# Patient Record
Sex: FEMALE | Race: WHITE | NOT HISPANIC OR LATINO | Employment: FULL TIME | URBAN - METROPOLITAN AREA
[De-identification: names, ages, dates, MRNs, and addresses within clinical notes are randomized per-mention and may not be internally consistent; named-entity substitution may affect disease eponyms.]

---

## 2022-07-03 ENCOUNTER — TRANSFERRED RECORDS (OUTPATIENT)
Dept: HEALTH INFORMATION MANAGEMENT | Facility: CLINIC | Age: 52
End: 2022-07-03

## 2022-07-03 LAB
ALT SERPL-CCNC: 28 U/L (ref 14–63)
AST SERPL-CCNC: 15 U/L (ref 15–37)

## 2022-07-05 ENCOUNTER — TRANSFERRED RECORDS (OUTPATIENT)
Dept: HEALTH INFORMATION MANAGEMENT | Facility: CLINIC | Age: 52
End: 2022-07-05

## 2022-07-07 ENCOUNTER — TRANSFERRED RECORDS (OUTPATIENT)
Dept: HEALTH INFORMATION MANAGEMENT | Facility: CLINIC | Age: 52
End: 2022-07-07

## 2022-07-26 ENCOUNTER — TRANSFERRED RECORDS (OUTPATIENT)
Dept: HEALTH INFORMATION MANAGEMENT | Facility: CLINIC | Age: 52
End: 2022-07-26

## 2022-08-01 ENCOUNTER — TRANSFERRED RECORDS (OUTPATIENT)
Dept: MULTI SPECIALTY CLINIC | Facility: CLINIC | Age: 52
End: 2022-08-01

## 2022-08-01 LAB
CREATININE (EXTERNAL): 0.67 MG/DL (ref 0.51–0.95)
GFR ESTIMATED (EXTERNAL): >60 ML/MIN/1.73M2
GLUCOSE (EXTERNAL): 99 MG/DL (ref 74–100)
POTASSIUM (EXTERNAL): 3.5 MMOL/L (ref 3.5–5.1)

## 2022-08-02 ENCOUNTER — MEDICAL CORRESPONDENCE (OUTPATIENT)
Dept: HEALTH INFORMATION MANAGEMENT | Facility: CLINIC | Age: 52
End: 2022-08-02

## 2022-08-02 ENCOUNTER — TRANSCRIBE ORDERS (OUTPATIENT)
Dept: OTHER | Age: 52
End: 2022-08-02

## 2022-08-02 DIAGNOSIS — N88.8 CERVICAL MASS: Primary | ICD-10-CM

## 2022-08-03 ENCOUNTER — PATIENT OUTREACH (OUTPATIENT)
Dept: ONCOLOGY | Facility: CLINIC | Age: 52
End: 2022-08-03

## 2022-08-03 ENCOUNTER — PRE VISIT (OUTPATIENT)
Dept: ONCOLOGY | Facility: CLINIC | Age: 52
End: 2022-08-03

## 2022-08-03 NOTE — PROGRESS NOTES
"New Patient Hematology / Oncology Nurse Navigator Note     Referral Date: 8/2/22    Referring provider:   Linda Carroll MD    [facility]    [address]    [place] [id number] [phone] (Work)    [phone] (Fax)      Referring Clinic/Organization: [facility]     Referred to: GynOnc    Requested provider (if applicable): First available - did not specify     Evaluation for : Thickened endometrial stripe, Endometrial hyperplasia without atypia, referral from OBGYN       Clinical History (per Nurse review of records provided):       8/2 Pelvic US showing:  IMPRESSION:   The endometrium is thickened but decreased compared to the prior study. There is   some blood flow within the endometrium.     Enlarged uterus with uterine fibroids.     Neither ovary was visualized on this exam.     7/7/22 Op Note: hysteroscopy and dilation and Curettage, removal endocervical polyp -- BOOKMARKED     7/29, 8/1 ED visits.--BOOKMARKED      Note from ED provider: \"As I attempted to discuss with you, your ultrasound showed a thickened endometrial stripe. A malignancy is definitely not excluded despite what you may or may not have been told in the past. Therefore, Dr. Carroll is going to reach out to a gynecology oncologist - someone who may be able to perform a hysterectomy to help with your vaginal bleeding. In the meantime, Dr. Carroll recommended increasing your Provera to hopefully get your symptoms under better control. Please return to ED with any concerns.  Electronically signed by Jarred Yeager MD at 08/02/2022 7:34 AM CDT\"    Clinical Assessment / Barriers to Care (Per Nurse):    Pt lives in [place]      Records Location: [facility]     Records Needed:   Images, path from [place]     Additional testing needed prior to consult:   N/A    Referral updates and Plan:   OUTGOING CALL to pt, no answer. No voicemail picked up so unable to leave VM.    Will route to scheduling to continue to try to reach " patient. Will follow-up as needed.    Hold Dr Grande 8/24 2:20PM @ Courtney Jordan, BSN, RN, PHN, OCN  Hematology/Oncology Nurse Navigator  [facility]  [phone]

## 2022-08-03 NOTE — TELEPHONE ENCOUNTER
Action 2022 10:19 AM ABT   Action Taken Slides from [place] received and taken to 5th floor path lab for review     RECORDS STATUS - ALL OTHER DIAGNOSIS      Action    Action Taken 8/3/22  Spoke w/ Pt - pt advised all recs from [facility]. Pt advised all imaging done @ [facility], none @ [facility]. Pt very anxious to get appt scheduled.     Imaging resolved to PACS  12:27 PM     RECORDS RECEIVED FROM: [facility], [facility]   DATE RECEIVED:    NOTES STATUS DETAILS   OFFICE NOTE from referring provider Received 8/3 Linda Carroll MD   DISCHARGE REPORT from the ER CE - [facility] 22, 7/3/22   OPERATIVE REPORT CE - [facility] 22: Hysteroscopy, D & C   MEDICATION LIST CE [facility]   LABS     PATHOLOGY REPORTS [facility], Report in CE ([facility]), Slides requested 8/3  [employer] 22: [id number]   ANYTHING RELATED TO DIAGNOSIS Epic/CE 22   IMAGING (NEED IMAGES & REPORT)     ULTRASOUND PACS 22, 7/3/22: [facility]

## 2022-08-04 ENCOUNTER — HOSPITAL ENCOUNTER (EMERGENCY)
Facility: CLINIC | Age: 52
Discharge: HOME | End: 2022-08-04
Attending: EMERGENCY MEDICINE | Admitting: EMERGENCY MEDICINE
Payer: MEDICAID

## 2022-08-04 VITALS
RESPIRATION RATE: 16 BRPM | TEMPERATURE: 97 F | DIASTOLIC BLOOD PRESSURE: 85 MMHG | SYSTOLIC BLOOD PRESSURE: 143 MMHG | OXYGEN SATURATION: 92 % | HEART RATE: 90 BPM

## 2022-08-04 DIAGNOSIS — N93.9 VAGINAL BLEEDING: ICD-10-CM

## 2022-08-04 LAB
ABO/RH(D): NORMAL
ANION GAP SERPL CALCULATED.3IONS-SCNC: 11 MMOL/L (ref 7–15)
ANTIBODY SCREEN: NEGATIVE
APTT PPP: 38 SECONDS (ref 22–38)
BASOPHILS # BLD AUTO: 0.1 10E3/UL (ref 0–0.2)
BASOPHILS NFR BLD AUTO: 1 %
BUN SERPL-MCNC: 4.9 MG/DL (ref 6–20)
CALCIUM SERPL-MCNC: 9.4 MG/DL (ref 8.6–10)
CHLORIDE SERPL-SCNC: 106 MMOL/L (ref 98–107)
CREAT SERPL-MCNC: 0.51 MG/DL (ref 0.51–0.95)
CRP SERPL-MCNC: 67.9 MG/L
DEPRECATED HCO3 PLAS-SCNC: 21 MMOL/L (ref 22–29)
EOSINOPHIL # BLD AUTO: 0.1 10E3/UL (ref 0–0.7)
EOSINOPHIL NFR BLD AUTO: 1 %
ERYTHROCYTE [DISTWIDTH] IN BLOOD BY AUTOMATED COUNT: 13.6 % (ref 10–15)
GFR SERPL CREATININE-BSD FRML MDRD: >90 ML/MIN/1.73M2
GLUCOSE SERPL-MCNC: 96 MG/DL (ref 70–99)
HCT VFR BLD AUTO: 42.2 % (ref 35–47)
HGB BLD-MCNC: 14.5 G/DL (ref 11.7–15.7)
HOLD SPECIMEN: NORMAL
IMM GRANULOCYTES # BLD: 0.1 10E3/UL
IMM GRANULOCYTES NFR BLD: 0 %
INR PPP: 1.05 (ref 0.85–1.15)
LACTATE SERPL-SCNC: 0.7 MMOL/L (ref 0.7–2)
LYMPHOCYTES # BLD AUTO: 1.6 10E3/UL (ref 0.8–5.3)
LYMPHOCYTES NFR BLD AUTO: 13 %
MCH RBC QN AUTO: 30.3 PG (ref 26.5–33)
MCHC RBC AUTO-ENTMCNC: 34.4 G/DL (ref 31.5–36.5)
MCV RBC AUTO: 88 FL (ref 78–100)
MONOCYTES # BLD AUTO: 0.9 10E3/UL (ref 0–1.3)
MONOCYTES NFR BLD AUTO: 7 %
NEUTROPHILS # BLD AUTO: 10.1 10E3/UL (ref 1.6–8.3)
NEUTROPHILS NFR BLD AUTO: 78 %
NRBC # BLD AUTO: 0 10E3/UL
NRBC BLD AUTO-RTO: 0 /100
PLATELET # BLD AUTO: 240 10E3/UL (ref 150–450)
POTASSIUM SERPL-SCNC: 3.6 MMOL/L (ref 3.4–5.3)
PROCALCITONIN SERPL IA-MCNC: 0.06 NG/ML
RBC # BLD AUTO: 4.79 10E6/UL (ref 3.8–5.2)
SODIUM SERPL-SCNC: 138 MMOL/L (ref 136–145)
SPECIMEN EXPIRATION DATE: NORMAL
WBC # BLD AUTO: 12.8 10E3/UL (ref 4–11)

## 2022-08-04 PROCEDURE — 85610 PROTHROMBIN TIME: CPT | Performed by: EMERGENCY MEDICINE

## 2022-08-04 PROCEDURE — 36415 COLL VENOUS BLD VENIPUNCTURE: CPT | Performed by: EMERGENCY MEDICINE

## 2022-08-04 PROCEDURE — 96374 THER/PROPH/DIAG INJ IV PUSH: CPT | Performed by: EMERGENCY MEDICINE

## 2022-08-04 PROCEDURE — 83605 ASSAY OF LACTIC ACID: CPT | Performed by: EMERGENCY MEDICINE

## 2022-08-04 PROCEDURE — 85730 THROMBOPLASTIN TIME PARTIAL: CPT | Performed by: EMERGENCY MEDICINE

## 2022-08-04 PROCEDURE — 250N000013 HC RX MED GY IP 250 OP 250 PS 637: Performed by: EMERGENCY MEDICINE

## 2022-08-04 PROCEDURE — 84145 PROCALCITONIN (PCT): CPT | Performed by: EMERGENCY MEDICINE

## 2022-08-04 PROCEDURE — 250N000011 HC RX IP 250 OP 636: Performed by: EMERGENCY MEDICINE

## 2022-08-04 PROCEDURE — 99285 EMERGENCY DEPT VISIT HI MDM: CPT | Mod: 25 | Performed by: EMERGENCY MEDICINE

## 2022-08-04 PROCEDURE — 86140 C-REACTIVE PROTEIN: CPT | Performed by: EMERGENCY MEDICINE

## 2022-08-04 PROCEDURE — 86850 RBC ANTIBODY SCREEN: CPT | Performed by: EMERGENCY MEDICINE

## 2022-08-04 PROCEDURE — 86901 BLOOD TYPING SEROLOGIC RH(D): CPT | Performed by: EMERGENCY MEDICINE

## 2022-08-04 PROCEDURE — 85025 COMPLETE CBC W/AUTO DIFF WBC: CPT | Performed by: EMERGENCY MEDICINE

## 2022-08-04 PROCEDURE — 82310 ASSAY OF CALCIUM: CPT | Performed by: EMERGENCY MEDICINE

## 2022-08-04 PROCEDURE — 99284 EMERGENCY DEPT VISIT MOD MDM: CPT | Performed by: EMERGENCY MEDICINE

## 2022-08-04 PROCEDURE — 96375 TX/PRO/DX INJ NEW DRUG ADDON: CPT | Performed by: EMERGENCY MEDICINE

## 2022-08-04 RX ORDER — OXYCODONE HYDROCHLORIDE 5 MG/1
5-10 TABLET ORAL EVERY 6 HOURS PRN
Qty: 14 TABLET | Refills: 0 | Status: SHIPPED | OUTPATIENT
Start: 2022-08-04 | End: 2022-08-11

## 2022-08-04 RX ORDER — KETOROLAC TROMETHAMINE 30 MG/ML
30 INJECTION, SOLUTION INTRAMUSCULAR; INTRAVENOUS ONCE
Status: COMPLETED | OUTPATIENT
Start: 2022-08-04 | End: 2022-08-04

## 2022-08-04 RX ORDER — ACETAMINOPHEN 500 MG
1000 TABLET ORAL 3 TIMES DAILY
Qty: 84 TABLET | Refills: 0 | Status: SHIPPED | OUTPATIENT
Start: 2022-08-04 | End: 2022-08-18

## 2022-08-04 RX ORDER — ACETAMINOPHEN 500 MG
1000 TABLET ORAL ONCE
Status: COMPLETED | OUTPATIENT
Start: 2022-08-04 | End: 2022-08-04

## 2022-08-04 RX ORDER — IBUPROFEN 200 MG
400 TABLET ORAL 3 TIMES DAILY
Qty: 84 TABLET | Refills: 0 | Status: SHIPPED | OUTPATIENT
Start: 2022-08-04 | End: 2022-08-18

## 2022-08-04 RX ORDER — MEGESTROL ACETATE 40 MG/1
80 TABLET ORAL 2 TIMES DAILY
Qty: 84 TABLET | Refills: 0 | Status: SHIPPED | OUTPATIENT
Start: 2022-08-04 | End: 2022-08-24

## 2022-08-04 RX ADMIN — KETOROLAC TROMETHAMINE 30 MG: 30 INJECTION, SOLUTION INTRAMUSCULAR at 16:53

## 2022-08-04 RX ADMIN — HYDROMORPHONE HYDROCHLORIDE 1 MG: 1 INJECTION, SOLUTION INTRAMUSCULAR; INTRAVENOUS; SUBCUTANEOUS at 14:29

## 2022-08-04 RX ADMIN — ACETAMINOPHEN 1000 MG: 500 TABLET, FILM COATED ORAL at 16:53

## 2022-08-04 ASSESSMENT — ENCOUNTER SYMPTOMS
COLOR CHANGE: 0
SHORTNESS OF BREATH: 0
LIGHT-HEADEDNESS: 1
ARTHRALGIAS: 0
DIFFICULTY URINATING: 0
NECK STIFFNESS: 0
ABDOMINAL PAIN: 1
EYE REDNESS: 0
HEADACHES: 0
CONFUSION: 0
FEVER: 0

## 2022-08-04 NOTE — ED TRIAGE NOTES
Pt presents with new onset dizziness and cramping. She has a malignant uterine mass and has been bleedingfor about 3 months. She is saturating a pad every 1-2 hrs. Cramping pain is a 10/10.      Triage Assessment     Row Name 08/04/22 6649       Triage Assessment (Adult)    Airway WDL WDL       Respiratory WDL    Respiratory WDL WDL       Skin Circulation/Temperature WDL    Skin Circulation/Temperature WDL WDL       Cardiac WDL    Cardiac WDL X  tachy       Peripheral/Neurovascular WDL    Peripheral Neurovascular WDL WDL       Cognitive/Neuro/Behavioral WDL    Cognitive/Neuro/Behavioral WDL WDL

## 2022-08-04 NOTE — CONSULTS
Gynecologic Oncology Consult Note    Name: Karen Dumont    MRN: [id number]   YOB: 1970         We were asked to see Karen Dumont at the request of Dr. Ludwig for evaluation and treatment of abnormal uterine bleeding.        Chief Complaint:   Heavy cramping and heavy uterine bleeding    History is obtained from the patient          History of Present Illness:   Karen Dumont is a 52 year old . female who is being seen for evaluation of heavy vaginal bleeding and cramping. She reports one month history of heavy vaginal bleeding and increased cramping. She underwent  D&C that showed complex endometrial hyperplasia without atypia and multiple endometrial polyps. She continued to have episodic vaginal bleeding post operatively for the following couple weeks and went to the ED on  for soaking two pads an hour and dizziness. Today, she is complaining of episodic bleeding and soaking of pads ( 2 an hour). She is on provera 5xday, but does not seem to be helping.  She has associated dizziness and fatigue. Her abdominal pain is rated as 7/10 and cramping in quality. She has not taken anything to make it better. No headaches, vision changes, nausea, vomiting, fevers, chills, chest pain, SOB, constipation, diarrhea, dysuria.     Gyn Hx: Menses: irregular  H/o STIs: none    22- D&C for AUB and suspected polyps    OB Hx: , hx of prior  for pre-eclampsia           Past Medical History:   History reviewed. No pertinent past medical history.         Past Surgical History:   Hx of prior          Social History:     Social History     Tobacco Use     Smoking status: Current Every Day Smoker     Types: Cigarettes     Smokeless tobacco: Never Used   Substance Use Topics     Alcohol use: Not Currently            Family History:   History reviewed. No pertinent family history.  No family history of  cancers (ovarian, cervical, uterine, breast or colon). No  family h/o VTE.          Allergies:   No Known Allergies         Medications:     Current Facility-Administered Medications   Medication     HYDROmorphone (DILAUDID) injection 1 mg     No current outpatient medications on file.             Review of Systems:    ROS: 10 point ROS negative other than those noted above in the HPI.       Physical Exam:     Vitals:    08/04/22 1353   BP: 130/88   Pulse: 98   Resp: 18   Temp: 99.4  F (37.4  C)   SpO2: 98%     General: NAD  Resp: no respiratory distress, CTAB with no wheezes, rubs or rhonchi  CV: heart rate regular, S1, S2. No murmurs noted.   MSK:  no CVAT bilaterally.  Abdomen: soft,  distended, moderately tender to palpation in suprapubic area. Vertical midline scar. No organomegaly  : normal external genitalia, no active bleeding from external os, normal cervix w/ exocervical polyp 1 cm  Ext: Warm, well perfused, no edema  Neuro: appears intact grossly moving all 4 extremities equally.  Skin: No rashes or ecchymoses noted.      Data     Results for orders placed or performed during the hospital encounter of 08/04/22 (from the past 24 hour(s))   [place] Draw    Narrative    The following orders were created for panel order [place] Draw.  Procedure                               Abnormality         Status                     ---------                               -----------         ------                     Extra Blue Top Tube[[id number]]                              Final result               Extra Red Top Tube[[id number]]                               Final result               Extra Green Top (Lithium...[[id number]]                      Final result               Extra Purple Top Tube[[id number]]                            Final result                 Please view results for these tests on the individual orders.   ABO/Rh type and screen    Narrative    The following orders were created for panel order ABO/Rh type and screen.  Procedure                                Abnormality         Status                     ---------                               -----------         ------                     Adult Type and Screen[[id number]]                            Final result                 Please view results for these tests on the individual orders.   Extra Red Top Tube   Result Value Ref Range    Hold Specimen JIC    Extra Green Top (Lithium Heparin) Tube   Result Value Ref Range    Hold Specimen JIC    Extra Purple Top Tube   Result Value Ref Range    Hold Specimen JIC    Adult Type and Screen   Result Value Ref Range    ABO/RH(D) B POS     Antibody Screen Negative Negative    SPECIMEN EXPIRATION DATE [id number]    CBC with platelets differential    Narrative    The following orders were created for panel order CBC with platelets differential.  Procedure                               Abnormality         Status                     ---------                               -----------         ------                     CBC with platelets and d...[[id number]]  Abnormal            Final result                 Please view results for these tests on the individual orders.   Basic metabolic panel   Result Value Ref Range    Creatinine 0.51 0.51 - 0.95 mg/dL    Sodium 138 136 - 145 mmol/L    Potassium 3.6 3.4 - 5.3 mmol/L    Urea Nitrogen 4.9 (L) 6.0 - 20.0 mg/dL    Chloride 106 98 - 107 mmol/L    Carbon Dioxide (CO2) 21 (L) 22 - 29 mmol/L    Anion Gap 11 7 - 15 mmol/L    Glucose 96 70 - 99 mg/dL    GFR Estimate >90 >60 mL/min/1.73m2    Calcium 9.4 8.6 - 10.0 mg/dL   CRP inflammation   Result Value Ref Range    CRP Inflammation 67.90 (H) <5.00 mg/L   Procalcitonin   Result Value Ref Range    Procalcitonin 0.06 (H) <0.05 ng/mL   CBC with platelets and differential   Result Value Ref Range    WBC Count 12.8 (H) 4.0 - 11.0 10e3/uL    RBC Count 4.79 3.80 - 5.20 10e6/uL    Hemoglobin 14.5 11.7 - 15.7 g/dL    Hematocrit 42.2 35.0 - 47.0 %    MCV 88 78 - 100 fL    MCH 30.3 26.5 - 33.0 pg     MCHC 34.4 31.5 - 36.5 g/dL    RDW 13.6 10.0 - 15.0 %    Platelet Count 240 150 - 450 10e3/uL    % Neutrophils 78 %    % Lymphocytes 13 %    % Monocytes 7 %    % Eosinophils 1 %    % Basophils 1 %    % Immature Granulocytes 0 %    NRBCs per 100 WBC 0 <1 /100    Absolute Neutrophils 10.1 (H) 1.6 - 8.3 10e3/uL    Absolute Lymphocytes 1.6 0.8 - 5.3 10e3/uL    Absolute Monocytes 0.9 0.0 - 1.3 10e3/uL    Absolute Eosinophils 0.1 0.0 - 0.7 10e3/uL    Absolute Basophils 0.1 0.0 - 0.2 10e3/uL    Absolute Immature Granulocytes 0.1 <=0.4 10e3/uL    Absolute NRBCs 0.0 10e3/uL   Extra Blue Top Tube   Result Value Ref Range    Hold Specimen JIC    INR   Result Value Ref Range    INR 1.05 0.85 - 1.15   Partial thromboplastin time   Result Value Ref Range    aPTT 38 22 - 38 Seconds   Lactic acid whole blood   Result Value Ref Range    Lactic Acid 0.7 0.7 - 2.0 mmol/L         Assessment and Recommendations     Impression:   Karen Dmuont is a 52 year old . female who is being seen for evaluation of heavy vaginal bleeding and cramping s/p D&C 22 with complex hyperplasia w/o atypia      Plan:   #abnormal uterine bleeding  #abdominal cramping  S/p 22 D&C that found complex hyperplasia without atypia and multiple polyps. Imaging showed fibroid uterus. Patient continues to have episodic bleeding. Hemoglobin is >14. Internal pelvic exam unremarkable aside from exocervical polyp and scant blood in vaginal vault.  -f/up appointment with Dr. Grande , or sooner if possible  -start megase 40mg BIDx21 days  -ibuprofen and tylenol PRN, oxycodone 5mg for breakthrough pain     Thank you for allowing us to be involved in the care of your patient. Please do not hesitate to give us a call with further questions.     Gyn Onc Team Pager: [phone]     Patient seen and care plan discussed under supervision of Dr. Hurtado.      Jimmy Mojica DO, MA  OB/GYN PGY-1  2022 4:05 PM

## 2022-08-04 NOTE — ED PROVIDER NOTES
[facility] DEPARTMENT ([facility])  8/04/22  History     Chief Complaint   Patient presents with     Vaginal Bleeding     The history is provided by the patient and medical records.     Karen Dumont is a 52 year old female with a past medical history significant for s/p  hysteroscopy (07/07/2022), s/p dilation and curettage (removal of endocervical polyp, 07/07/2022), and dysmenorrhea who presents to the Emergency Department for evaluation of vaginal bleeding.    Patient reports that she has been experiencing persistent vaginal bleeding for the last 3 months.  She states that she had had surgical removal of endocervical polyps on 07/07/2022.  She says that the purpose of the surgery was to attempt to stop her vaginal bleeding.  She adds that she continues to experience vaginal bleeding following the procedure.  She notes that for the last week she has noticed yellow, sticky discharge accompanying her vaginal bleeding.  She adds that at the time of the yellow discharge she also had a fever and was seen in the [facility] ED on 08/01.  She says that during this hospital visit she was kept overnight to monitor her symptoms and was later discharged home.  She states that the vaginal discharge has resolved but is still experiencing vaginal bleeding that has worsened over the last week.  She adds that her vaginal bleeding is accompanied with new symptoms of abdominal cramping and lightheadedness.  She rates abdominal cramping a 12 out of 10 on the pain scale.   She notes that her abdominal cramping feels like a pressure and pain like sensation. She says that she has noticed that her vaginal bleeding has increased today prompting her visit to the ED.  She adds that when she walks around she feels like she is bleeding more.  She states that in regards to her lightheadedness that she feels like the room is spinning when she gets up too fast.  She adds that when she is still it makes her  lightheadedness better.  She notes that she does have upcoming appointment on 08/24/2022 to have a hysterectomy performed at [facility].  She says that she has been feeling more generally weak in nature.    Per chart review, patient was seen at [facility] ED from 08/01/2022 to 08/02/2022 for evaluation of fever and vaginal bleeding. Recent presentation on 7/3/2022, pelvic US with multiple fibroids.  Hemoglobin was found to be 14.4.  No signs of infection at the time of this visit.  Patient's ultrasound showed a thickened endometrial stripe. A malignancy was definitely not excluded. Dr. Carroll contacted gynecology oncologist to perform a hysterectomy to help with vaginal bleeding. Dr. Carroll recommended increasing Provera in hopes of controlling the patient's symptoms.      US PELVIS WITH ENDOVAG NON OB (08/02/2022 12:18 AM CDT)  IMPRESSION:  The endometrium is thickened but decreased compared to the prior study. There is  some blood flow within the endometrium.     Enlarged uterus with uterine fibroids.     Neither ovary was visualized on this exam.     History reviewed. No pertinent past medical history.    History reviewed. No pertinent surgical history.    History reviewed. No pertinent family history.    Social History     Tobacco Use     Smoking status: Current Every Day Smoker     Types: Cigarettes     Smokeless tobacco: Never Used   Substance Use Topics     Alcohol use: Not Currently       Current Facility-Administered Medications   Medication     HYDROmorphone (DILAUDID) injection 1 mg     No current outpatient medications on file.      No Known Allergies     I have reviewed the Medications, Allergies, Past Medical and Surgical History, and Social History in the Epic system.    Review of Systems   Constitutional: Negative for fever.   HENT: Negative for congestion.    Eyes: Negative for redness.   Respiratory: Negative for shortness of breath.    Cardiovascular: Negative for chest pain.    Gastrointestinal: Positive for abdominal pain.   Genitourinary: Positive for vaginal bleeding. Negative for difficulty urinating.   Musculoskeletal: Negative for arthralgias and neck stiffness.   Skin: Negative for color change.   Neurological: Positive for light-headedness. Negative for headaches.   Psychiatric/Behavioral: Negative for confusion.     A complete review of systems was performed with pertinent positives and negatives noted in the HPI, and all other systems negative.    Physical Exam   BP: 130/88  Pulse: 98  Temp: 99.4  F (37.4  C)  Resp: 18  SpO2: 98 %      Physical Exam  Vitals and nursing note reviewed.   Constitutional:       General: She is not in acute distress.     Appearance: Normal appearance. She is not diaphoretic.   HENT:      Head: Atraumatic.      Mouth/Throat:      Pharynx: No oropharyngeal exudate.   Eyes:      General: No scleral icterus.     Pupils: Pupils are equal, round, and reactive to light.   Cardiovascular:      Heart sounds: Normal heart sounds.   Pulmonary:      Effort: No respiratory distress.      Breath sounds: Normal breath sounds.   Abdominal:      General: Bowel sounds are normal.      Palpations: Abdomen is soft.      Tenderness: There is no abdominal tenderness.   Musculoskeletal:         General: No tenderness.   Skin:     General: Skin is warm.      Findings: No rash.   Neurological:      General: No focal deficit present.      Mental Status: She is alert and oriented to person, place, and time.           ED Course     At 2:07 PM the patient was seen and examined by Celina Ludwig MD in Room ED10.        Procedures           Results for orders placed or performed during the hospital encounter of 08/04/22 (from the past 24 hour(s))   [place] Draw    Narrative    The following orders were created for panel order Sebastian Draw.  Procedure                               Abnormality         Status                     ---------                               -----------          ------                     Extra Blue Top Tube[[id number]]                                                         Extra Red Top Tube[[id number]]                                                          Extra Green Top (Lithium...[[id number]]                                                 Extra Purple Top Tube[[id number]]                                                         Please view results for these tests on the individual orders.   ABO/Rh type and screen    Narrative    The following orders were created for panel order ABO/Rh type and screen.  Procedure                               Abnormality         Status                     ---------                               -----------         ------                     Adult Type and Screen[[id number]]                                                         Please view results for these tests on the individual orders.     Medications - No data to display          Assessments & Plan (with Medical Decision Making)     52 year old female with a past medical history significant for s/p  hysteroscopy (07/07/2022), s/p dilation and curettage (removal of endocervical polyp, 07/07/2022), and dysmenorrhea who presents to the Emergency Department for evaluation of vaginal bleeding. Vital signs stable in triage including blood pressure 130/88, pulse ox normal at 98% on room air.  IV established, labs drawn sent reviewed document epic essentially all unremarkable with hemoglobin stable at 14.5.  She was given Dilaudid and ketorolac and acetaminophen with adequate relief of her pain.  Gynecology was consulted and evaluated patient at bedside in the emergency department.  Please refer to their note for further details.  Patient discharged home with plan to follow-up with GYN as arranged.  Patient expressed verbal understanding on anticipatory guidance return precautions to the emergency room.    I have reviewed the nursing notes.    I have reviewed the findings, diagnosis,  plan and need for follow up with the patient.    New Prescriptions    No medications on file       Final diagnoses:   Vaginal bleeding       INurys am serving as a trained medical scribe to document services personally performed by Celina Ludwig MD, based on the provider's statements to me.      Celina MORE MD, was physically present and have reviewed and verified the accuracy of this note documented by Nurys Rene.     Celina Ludwig MD  8/4/2022   [facility] EMERGENCY DEPARTMENT     Celina Ludwig MD  08/30/22 2050

## 2022-08-08 ENCOUNTER — LAB REQUISITION (OUTPATIENT)
Dept: LAB | Facility: CLINIC | Age: 52
End: 2022-08-08
Payer: MEDICAID

## 2022-08-08 PROCEDURE — 88321 CONSLTJ&REPRT SLD PREP ELSWR: CPT | Performed by: PATHOLOGY

## 2022-08-09 ENCOUNTER — PATIENT OUTREACH (OUTPATIENT)
Dept: CARE COORDINATION | Facility: CLINIC | Age: 52
End: 2022-08-09

## 2022-08-09 LAB
PATH REPORT.COMMENTS IMP SPEC: NORMAL
PATH REPORT.FINAL DX SPEC: NORMAL
PATH REPORT.GROSS SPEC: NORMAL
PATH REPORT.MICROSCOPIC SPEC OTHER STN: NORMAL
PATH REPORT.RELEVANT HX SPEC: NORMAL
PATH REPORT.RELEVANT HX SPEC: NORMAL
PATH REPORT.SITE OF ORIGIN SPEC: NORMAL

## 2022-08-09 NOTE — PROGRESS NOTES
Social Work Note: Telephone Call  Oncology Clinic    Data/Intervention: 22  Patient Name:  Karen Dumont  /Age:  1970 (52 year old)    Reason for Call:  ASHKAN received a consult to reach out to Karen re: insurance/financial resources. ASHKAN attempted to connect with Karen via phone today to discuss resources that may be available to her. SW received no answer and a message with SW contact info encouraging a call back.     Plan:  SW will continue to follow and assist as needed for ongoing resource/support needs.     DARRYL Castillo, LGSW  [facility]  Adult Oncology Clinic  Phone: [phone]

## 2022-08-11 ENCOUNTER — TELEPHONE (OUTPATIENT)
Dept: ONCOLOGY | Facility: CLINIC | Age: 52
End: 2022-08-11

## 2022-08-17 ENCOUNTER — ONCOLOGY VISIT (OUTPATIENT)
Dept: ONCOLOGY | Facility: CLINIC | Age: 52
End: 2022-08-17
Attending: OBSTETRICS & GYNECOLOGY
Payer: MEDICAID

## 2022-08-17 VITALS
WEIGHT: 177 LBS | TEMPERATURE: 97.8 F | HEIGHT: 59 IN | HEART RATE: 84 BPM | SYSTOLIC BLOOD PRESSURE: 148 MMHG | OXYGEN SATURATION: 98 % | DIASTOLIC BLOOD PRESSURE: 88 MMHG | RESPIRATION RATE: 16 BRPM | BODY MASS INDEX: 35.68 KG/M2

## 2022-08-17 DIAGNOSIS — N88.8 CERVICAL MASS: ICD-10-CM

## 2022-08-17 DIAGNOSIS — N93.8 DYSFUNCTIONAL UTERINE BLEEDING: Primary | ICD-10-CM

## 2022-08-17 PROCEDURE — G0463 HOSPITAL OUTPT CLINIC VISIT: HCPCS

## 2022-08-17 PROCEDURE — 99203 OFFICE O/P NEW LOW 30 MIN: CPT | Performed by: OBSTETRICS & GYNECOLOGY

## 2022-08-17 RX ORDER — ALBUTEROL SULFATE 90 UG/1
1-2 AEROSOL, METERED RESPIRATORY (INHALATION) EVERY 6 HOURS PRN
COMMUNITY
Start: 2021-12-26

## 2022-08-17 ASSESSMENT — PAIN SCALES - GENERAL: PAINLEVEL: NO PAIN (1)

## 2022-08-17 NOTE — NURSING NOTE
"Oncology Rooming Note    August 17, 2022 2:01 PM   Karen Dumont is a 52 year old female who presents for:    Chief Complaint   Patient presents with     Oncology Clinic Visit     Cervical mass     Initial Vitals: BP (!) 148/88   Pulse 84   Temp 97.8  F (36.6  C) (Oral)   Resp 16   Ht 1.491 m (4' 10.7\")   Wt 80.3 kg (177 lb)   SpO2 98%   BMI 36.11 kg/m   Estimated body mass index is 36.11 kg/m  as calculated from the following:    Height as of this encounter: 1.491 m (4' 10.7\").    Weight as of this encounter: 80.3 kg (177 lb). Body surface area is 1.82 meters squared.  No Pain (1) Comment: Data Unavailable   No LMP recorded. Patient is postmenopausal.  Allergies reviewed: Yes  Medications reviewed: Yes    Medications: Medication refills not needed today.  Pharmacy name entered into [employer]: [id number] - [place], [place] - [address] [facility] [address]    Clinical concerns: none       Fiorella Traore CMA            "

## 2022-08-17 NOTE — LETTER
2022         RE: Karen Dumont  [address]  [place] [address]        Dear Colleague,    Thank you for referring your patient, Karen Dumont, to the [facility]. Please see a copy of my visit note below.    GYNECOLOGIC  ONCOLOGY CONSULT    Referring provider:    Linda Carroll MD  [facility]  [address]  [place] [address]     RE: Karen Dumont  : 1970  JOEL: 2022    CC: Dysfunctional Uterine Bleeding    HPI: Ms Karen Dumont is a 52 year old female who presents for consultation regarding abnormal uterine bleeding. She is here today with her daughter.      She was most recently seen in ED for heavy vaginal bleeding and cramping on (22 and 22)..    She reports six  month history of heavy vaginal bleeding and increased cramping. She underwent  D&C that showed complex endometrial hyperplasia without atypia and multiple endometrial polyps. She continued to have episodic vaginal bleeding post operatively. At that time she was started on Provera and this was hanged to Megace 40mg BID after her visit on 22.    Since that time she reports the bleeding has improved with starting the Megace. She ultimately does want to have surgery she does not have insurance and need to work. She would like to explore non-surgical options.    Work up to date  7/3//22 Pelvic US  FINDINGS:   Uterus: Anteverted uterus measuring 14.6 x 8.1 x 4.9 cm. Anterior fundal intramural fibroid measuring 5.3 x 2.8 x 4.6 cm. Anterior mid uterine body intramural/subserosal fibroid measuring 3.5 x 2.5 x 3.1 cm. Right uterine body intramural fibroid measuring 5.0 x 3.8 x 4.9 cm.   Endometrium: Endometrial stripe measures 56 mm. Heterogeneous echogenic endometrial lesion measuring approximately 4.5 x 3.6 x 3.5 cm with apparentvascular stalk.      D & C, reviewed at [facility] report 22  CASE FROM [facility], [place], [place] ([id number],  OBTAINED 2022):  A.  Cervix, polypectomy:  -Cervical tissue with nabothian cyst     B.  Endometrium, curettage:  -Fragments of endometrial polyps featuring endometrial hyperplasia without atypia     22 Hg 14.5    OBGYN history and Health Maintenance:    Last Pap Smear: needs to obtain  Last Mammogram: none  Last Colonoscopy: none    Review of Systems:  Systemic:No weight changes.    Skin : No skin changes or new lesions.   Eye : No changes in vision.   Pulmonary: No cough or SOB.   Cardiovascular: No CP or palpitations  Gastrointestinal : No diarrhea, constipation, abdominal pain. Bowel habits normal.   Genitourinary: No dysuria, urgency or + bleeding  Psychiatric: No depression or anxiety  Hematologic : No palpable lymph nodes.   Endocrine : No hot flashes. No heat/cold intolerance.      Neurological: No headaches, no numbness.     Past Medical History:   Diagnosis Date     Anxiety      Asthma      Bipolar affective disorder (H)      Essential hypertension      Gastroesophageal reflux disease without esophagitis      Migraine        Past Surgical History:   Procedure Laterality Date      SECTION            Current Outpatient Medications   Medication Sig Dispense Refill     acetaminophen (TYLENOL) 500 MG tablet Take 2 tablets (1,000 mg) by mouth 3 times daily for 14 days 84 tablet 0     ibuprofen (ADVIL/MOTRIN) 200 MG tablet Take 2 tablets (400 mg) by mouth 3 times daily for 14 days 84 tablet 0     megestrol (MEGACE) 40 MG tablet Take 2 tablets (80 mg) by mouth 2 times daily for 21 days 84 tablet 0         No Known Allergies    Social History:  Social History     Tobacco Use     Smoking status: Current Every Day Smoker     Types: Cigarettes     Smokeless tobacco: Never Used   Substance Use Topics     Alcohol use: Not Currently       Family History:   The patient's family history is notable for   No family history on file.      Physical Exam:     BP (!) 148/88   Pulse 84   Temp 97.8  F  "(36.6  C) (Oral)   Resp 16   Ht 1.491 m (4' 10.7\")   Wt 80.3 kg (177 lb)   SpO2 98%   BMI 36.11 kg/m    Body mass index is 36.11 kg/m .    General: Alert and oriented, no acute distress  Psych: Mood stable      Assessment: Karen Dumont is a 52 year old woman with a dysfunctional uterine bleeding, s/p D & C showing endometrial polyp and endometrial hyperplasia with atypia.    Her bleeding has improved with Megace which suggests it would also improve with use of Mirena IUD.    At this time she is not able to proceed with surgery due lack of insurance and work obligations.      Plan:     1.)   Recommend to have Mirena IUD placed, which is urgent and medically necessary. Patient will return to have this placed as soon as possible.      Ling Nicole M.D., MPH,  F.A.C.O.G.  Professor  Department of Ob/Gyn and Women's Health  Division of Gynecologic Oncology  [facility]  [phone]      Time: total time spent today, August 17, 2022, including preparation, review of outside records, face to face counseling, and documentation was 30 minutes.     "

## 2022-08-17 NOTE — PROGRESS NOTES
GYNECOLOGIC  ONCOLOGY CONSULT    Referring provider:    Linda Carroll MD  [facility]  [address]  [place] [address]   RE: Karen Dumont  : 1970  JOEL: 2022    CC: Dysfunctional Uterine Bleeding    HPI: Ms Karen Dumont is a 52 year old female who presents for consultation regarding abnormal uterine bleeding. She is here today with her daughter.      She was most recently seen in ED for heavy vaginal bleeding and cramping on (22 and 22)..    She reports six  month history of heavy vaginal bleeding and increased cramping. She underwent  D&C that showed complex endometrial hyperplasia without atypia and multiple endometrial polyps. She continued to have episodic vaginal bleeding post operatively. At that time she was started on Provera and this was hanged to Megace 40mg BID after her visit on 22.    Since that time she reports the bleeding has improved with starting the Megace. She ultimately does want to have surgery she does not have insurance and need to work. She would like to explore non-surgical options.    Work up to date  7/3//22 Pelvic US  FINDINGS:   Uterus: Anteverted uterus measuring 14.6 x 8.1 x 4.9 cm. Anterior fundal intramural fibroid measuring 5.3 x 2.8 x 4.6 cm. Anterior mid uterine body intramural/subserosal fibroid measuring 3.5 x 2.5 x 3.1 cm. Right uterine body intramural fibroid measuring 5.0 x 3.8 x 4.9 cm.   Endometrium: Endometrial stripe measures 56 mm. Heterogeneous echogenic endometrial lesion measuring approximately 4.5 x 3.6 x 3.5 cm with apparentvascular stalk.      D & C, reviewed at [facility] report 22  CASE FROM [facility], [place], [place] ([id number], OBTAINED 2022):  A.  Cervix, polypectomy:  -Cervical tissue with nabothian cyst     B.  Endometrium, curettage:  -Fragments of endometrial polyps featuring endometrial hyperplasia without atypia     22 Hg 14.5    OBGYN history and Health  "Maintenance:    Last Pap Smear: needs to obtain  Last Mammogram: none  Last Colonoscopy: none    Review of Systems:  Systemic:No weight changes.    Skin : No skin changes or new lesions.   Eye : No changes in vision.   Pulmonary: No cough or SOB.   Cardiovascular: No CP or palpitations  Gastrointestinal : No diarrhea, constipation, abdominal pain. Bowel habits normal.   Genitourinary: No dysuria, urgency or + bleeding  Psychiatric: No depression or anxiety  Hematologic : No palpable lymph nodes.   Endocrine : No hot flashes. No heat/cold intolerance.      Neurological: No headaches, no numbness.     Past Medical History:   Diagnosis Date     Anxiety      Asthma      Bipolar affective disorder (H)      Essential hypertension      Gastroesophageal reflux disease without esophagitis      Migraine        Past Surgical History:   Procedure Laterality Date      SECTION            Current Outpatient Medications   Medication Sig Dispense Refill     acetaminophen (TYLENOL) 500 MG tablet Take 2 tablets (1,000 mg) by mouth 3 times daily for 14 days 84 tablet 0     ibuprofen (ADVIL/MOTRIN) 200 MG tablet Take 2 tablets (400 mg) by mouth 3 times daily for 14 days 84 tablet 0     megestrol (MEGACE) 40 MG tablet Take 2 tablets (80 mg) by mouth 2 times daily for 21 days 84 tablet 0         No Known Allergies    Social History:  Social History     Tobacco Use     Smoking status: Current Every Day Smoker     Types: Cigarettes     Smokeless tobacco: Never Used   Substance Use Topics     Alcohol use: Not Currently       Family History:   The patient's family history is notable for   No family history on file.      Physical Exam:     BP (!) 148/88   Pulse 84   Temp 97.8  F (36.6  C) (Oral)   Resp 16   Ht 1.491 m (4' 10.7\")   Wt 80.3 kg (177 lb)   SpO2 98%   BMI 36.11 kg/m    Body mass index is 36.11 kg/m .    General: Alert and oriented, no acute distress  Psych: Mood stable      Assessment: Karen Dumont is " a 52 year old woman with a dysfunctional uterine bleeding, s/p D & C showing endometrial polyp and endometrial hyperplasia with atypia.    Her bleeding has improved with Megace which suggests it would also improve with use of Mirena IUD.    At this time she is not able to proceed with surgery due lack of insurance and work obligations.      Plan:     1.)   Recommend to have Mirena IUD placed, which is urgent and medically necessary. Patient will return to have this placed as soon as possible.      Ling Nicole M.D., MPH,  F.A.C.O.G.  Professor  Department of Ob/Gyn and Women's Health  Division of Gynecologic Oncology  [facility]/[employer] [place]  [phone]      Time: total time spent today, August 17, 2022, including preparation, review of outside records, face to face counseling, and documentation was 30 minutes.

## 2022-08-18 NOTE — PATIENT INSTRUCTIONS
It was a pleasure seeing you in clinic today-please reach out if there are any further questions that arise following today's visit.  During business hours, you may reach me at [phone].  For urgent/emergent questions after business hours, you may reach the on-call Gynecologic Oncology Resident through the [facility]  at [phone].    All normal test results are usually communicated via letter or [employer] message.  Abnormal results (those that require a change in the previously discussed plan of care) are usually communicated via a phone call.    I recommend signing up for Not iT access if you have not already done so.  This allows you online access to your lab results and also helps you communicate efficiently with your clinic should any questions arise in your care.    Follow up appointment in 1 week for IUD placement, scheduling will call you to help make an appointment    Dr Corey Celaya RN  Phone:  [phone]  Fax:  [phone]

## 2022-08-24 ENCOUNTER — TELEPHONE (OUTPATIENT)
Dept: ONCOLOGY | Facility: CLINIC | Age: 52
End: 2022-08-24

## 2022-08-24 DIAGNOSIS — N93.8 DYSFUNCTIONAL UTERINE BLEEDING: Primary | ICD-10-CM

## 2022-08-24 RX ORDER — MEGESTROL ACETATE 40 MG/1
80 TABLET ORAL 2 TIMES DAILY
Qty: 84 TABLET | Refills: 0 | Status: SHIPPED | OUTPATIENT
Start: 2022-08-24 | End: 2022-09-14

## 2022-08-24 NOTE — TELEPHONE ENCOUNTER
Medication: Megestrol 40 mg tablet  Last prescribing provider: Celina Ludwig    Last clinic visit date: 8/17/22    Any missed appointments or no-shows since last clinic visit?: New pt    Recommendations for requested medication (if none, N/A): Requesting refill from Dr. Nicole, not sure who ordered initially    Next clinic visit date: not yet scheduled    Any other pertinent information (if none, N/A): NA    Pended and Routed to Provider.

## 2022-08-24 NOTE — TELEPHONE ENCOUNTER
Called pt back per request of RNCC to let pt know that RNCC is working on everything.  Pt states she is not getting an IUD now because that was the plan before she knew she had insurance coverage for the hysterectomy. Now that she has insurance coverage for the hysterectomy, she wants to proceed with the hysterectomy. Her PCP has told her that if the Megace is working, and pt states it is working and she is not bleeding, that she should continue that medication and not get the IUD.    Explained to pt that RNCC was working on coordinating all pieces of her care and she would call her back once she has more information. Pt verbalized understanding of this information.    Information forwarded to Nydia and Care Team.

## 2022-08-24 NOTE — TELEPHONE ENCOUNTER
Nurse Triage SBAR    Situation: Concerns about upcoming surgery    Background:    DX: dysfunctional uterine bleeding  Provider: Dr. Nicole  Most recent appointment: 8/17/22 with Dr. Nicole who recommended pt have Mirena IUD placed, which is urgent and medically necessary. Patient will return to have this placed as soon as possible  Upcoming appointments: Not currently scheduled.     Assessment:   Pt is calling with the following concerns that she would like to be addressed:    1) Refill of Megace. Has 12 pills left, enough for 3 days (takes 4 tabs/day). Rx is written as taking for 21 days, but pt states she has been instructed to take it daily up until the time of her surgery. She has hx of heavy bleeding x 4 months and this medication works best for her. Refill pended up in separate refill encounter.    2) Surgery Date: Pt states she was supposed to hear back from someone  last week about a surgery date.   States she would like surgery to be scheduled for the beginning of October because that is when her family can be here to help her recover.     3) Insurance: Pt completed paperwork and was informed that she is eligible for insurance coverage through [facility] yesterday. Surgery will be covered.     Recommendation:   Megace pended and routed to Dr. Nicole in refill encounter.  Message routed to Nydia Perez, ANDREIACC and Dr. Corey Sin's .

## 2022-09-07 LAB
ABO/RH(D): NORMAL
ANTIBODY SCREEN: NEGATIVE
SPECIMEN EXPIRATION DATE: NORMAL

## 2022-09-08 ENCOUNTER — ONCOLOGY VISIT (OUTPATIENT)
Dept: ONCOLOGY | Facility: CLINIC | Age: 52
End: 2022-09-08
Attending: OBSTETRICS & GYNECOLOGY
Payer: MEDICAID

## 2022-09-08 ENCOUNTER — LAB (OUTPATIENT)
Dept: LAB | Facility: CLINIC | Age: 52
End: 2022-09-08
Payer: MEDICAID

## 2022-09-08 VITALS
SYSTOLIC BLOOD PRESSURE: 136 MMHG | HEART RATE: 99 BPM | BODY MASS INDEX: 35.28 KG/M2 | HEIGHT: 59 IN | TEMPERATURE: 98.2 F | WEIGHT: 175 LBS | RESPIRATION RATE: 18 BRPM | DIASTOLIC BLOOD PRESSURE: 79 MMHG | OXYGEN SATURATION: 99 %

## 2022-09-08 DIAGNOSIS — N85.02 EIN (ENDOMETRIAL INTRAEPITHELIAL NEOPLASIA): Primary | ICD-10-CM

## 2022-09-08 DIAGNOSIS — N85.02 EIN (ENDOMETRIAL INTRAEPITHELIAL NEOPLASIA): ICD-10-CM

## 2022-09-08 LAB
ERYTHROCYTE [DISTWIDTH] IN BLOOD BY AUTOMATED COUNT: 13.5 % (ref 10–15)
HCT VFR BLD AUTO: 44.3 % (ref 35–47)
HGB BLD-MCNC: 15 G/DL (ref 11.7–15.7)
MCH RBC QN AUTO: 30 PG (ref 26.5–33)
MCHC RBC AUTO-ENTMCNC: 33.9 G/DL (ref 31.5–36.5)
MCV RBC AUTO: 89 FL (ref 78–100)
PLATELET # BLD AUTO: 237 10E3/UL (ref 150–450)
RBC # BLD AUTO: 5 10E6/UL (ref 3.8–5.2)
WBC # BLD AUTO: 11.5 10E3/UL (ref 4–11)

## 2022-09-08 PROCEDURE — G0463 HOSPITAL OUTPT CLINIC VISIT: HCPCS | Mod: 25

## 2022-09-08 PROCEDURE — 36415 COLL VENOUS BLD VENIPUNCTURE: CPT | Performed by: PATHOLOGY

## 2022-09-08 PROCEDURE — 99215 OFFICE O/P EST HI 40 MIN: CPT | Mod: GC | Performed by: OBSTETRICS & GYNECOLOGY

## 2022-09-08 PROCEDURE — 93005 ELECTROCARDIOGRAM TRACING: CPT

## 2022-09-08 PROCEDURE — G0463 HOSPITAL OUTPT CLINIC VISIT: HCPCS

## 2022-09-08 PROCEDURE — 93010 ELECTROCARDIOGRAM REPORT: CPT | Mod: 59 | Performed by: INTERNAL MEDICINE

## 2022-09-08 PROCEDURE — 86901 BLOOD TYPING SEROLOGIC RH(D): CPT | Performed by: OBSTETRICS & GYNECOLOGY

## 2022-09-08 PROCEDURE — 85027 COMPLETE CBC AUTOMATED: CPT | Performed by: PATHOLOGY

## 2022-09-08 RX ORDER — CEFAZOLIN SODIUM 2 G/50ML
2 SOLUTION INTRAVENOUS
Status: CANCELLED | OUTPATIENT
Start: 2022-09-08

## 2022-09-08 RX ORDER — CEFAZOLIN SODIUM 2 G/50ML
2 SOLUTION INTRAVENOUS SEE ADMIN INSTRUCTIONS
Status: CANCELLED | OUTPATIENT
Start: 2022-09-08

## 2022-09-08 RX ORDER — HEPARIN SODIUM 5000 [USP'U]/.5ML
5000 INJECTION, SOLUTION INTRAVENOUS; SUBCUTANEOUS
Status: CANCELLED | OUTPATIENT
Start: 2022-09-08

## 2022-09-08 RX ORDER — ACETAMINOPHEN 325 MG/1
975 TABLET ORAL ONCE
Status: CANCELLED | OUTPATIENT
Start: 2022-09-08 | End: 2022-09-08

## 2022-09-08 ASSESSMENT — PAIN SCALES - GENERAL: PAINLEVEL: SEVERE PAIN (7)

## 2022-09-08 NOTE — NURSING NOTE
"Oncology Rooming Note    September 8, 2022 2:45 PM   Karen Dumont is a 52 year old female who presents for:    Chief Complaint   Patient presents with     Oncology Clinic Visit     Dysfunctional Uterine Bleeding     Initial Vitals: /79   Pulse 99   Temp 98.2  F (36.8  C) (Oral)   Resp 18   Ht 1.491 m (4' 10.7\")   Wt 79.4 kg (175 lb)   SpO2 99%   BMI 35.71 kg/m   Estimated body mass index is 35.71 kg/m  as calculated from the following:    Height as of this encounter: 1.491 m (4' 10.7\").    Weight as of this encounter: 79.4 kg (175 lb). Body surface area is 1.81 meters squared.  Severe Pain (7) Comment: Data Unavailable   No LMP recorded. Patient is postmenopausal.  Allergies reviewed: Yes  Medications reviewed: Yes    Medications: Medication refills not needed today.  Pharmacy name entered into [facility]: [id number] [place], [place] - [address] AT [facility] [address]    Clinical concerns:        Lona Quintana CMA              "

## 2022-09-08 NOTE — PROGRESS NOTES
GYNECOLOGIC  ONCOLOGY CLINIC    Referring provider:    Linda Carroll MD  [facility]  [address]  [place]  [place] [address]   RE: Karen Dumont  : 1970  JOEL: 2022      CC: Dysfunctional Uterine Bleeding    HPI: Ms Karen Dumont is a 52 year old female who presents for treatment planning.    She presents to discuss surgery options.    She had previously been unable to have surgery as she did not have insurance, however she has since received insurance and would prefer to proceed with surgery at this time.    Bleeding was initially improved with Megace, but has increased significantly in the last week. She has been feeling quite dizzy in the last week as well. Has had a lot of abdominal pain and cramping as well. She has been taking the Megace regularly.     Work up to date  7/3//22 Pelvic US  FINDINGS:   Uterus: Anteverted uterus measuring 14.6 x 8.1 x 4.9 cm. Anterior fundal intramural fibroid measuring 5.3 x 2.8 x 4.6 cm. Anterior mid uterine body intramural/subserosal fibroid measuring 3.5 x 2.5 x 3.1 cm. Right uterine body intramural fibroid measuring 5.0 x 3.8 x 4.9 cm.   Endometrium: Endometrial stripe measures 56 mm. Heterogeneous echogenic endometrial lesion measuring approximately 4.5 x 3.6 x 3.5 cm with apparentvascular stalk.      D & C, reviewed at [facility] report 22  CASE FROM [facility], [place], [place] ([id number], OBTAINED 2022):  A.  Cervix, polypectomy:  -Cervical tissue with nabothian cyst     B.  Endometrium, curettage:  -Fragments of endometrial polyps featuring endometrial hyperplasia without atypia     22 Hg 14.5    OBGYN history and Health Maintenance:    Last Pap Smear: needs to obtain  Last Mammogram: none  Last Colonoscopy: none    Review of Systems:  Systemic:No weight changes.    Skin : No skin changes or new lesions.   Eye : No changes in vision.   Pulmonary: No cough or SOB.   Cardiovascular: No CP or  "palpitations  Gastrointestinal : No diarrhea, constipation, positive for abdominal pain. Bowel habits normal.   Genitourinary: No dysuria, urgency, positive for vaginal bleeding  Psychiatric: Positive for anxiety  Hematologic : No palpable lymph nodes.   Endocrine : No hot flashes. No heat/cold intolerance.      Neurological: No headaches, no numbness.     Past Medical History:   Diagnosis Date     Anxiety      Asthma      Bipolar affective disorder (H)      Essential hypertension      Gastroesophageal reflux disease without esophagitis      Migraine        Past Surgical History:   Procedure Laterality Date      SECTION     - via vertical midline skin incision       Current Outpatient Medications   Medication Sig Dispense Refill     albuterol (PROAIR HFA/PROVENTIL HFA/VENTOLIN HFA) 108 (90 Base) MCG/ACT inhaler Inhale 1-2 puffs into the lungs       megestrol (MEGACE) 40 MG tablet Take 2 tablets (80 mg) by mouth 2 times daily 84 tablet 0     Multiple Vitamins-Minerals (MULTIVITAMIN ADULT, MINERALS,) TABS Take 1 tablet by mouth           No Known Allergies    Social History:  Social History     Tobacco Use     Smoking status: Current Every Day Smoker     Types: Cigarettes     Smokeless tobacco: Never Used   Substance Use Topics     Alcohol use: Not Currently       Family History:   The patient's family history is notable for   No family history on file.      Physical Exam:     /79   Pulse 99   Temp 98.2  F (36.8  C) (Oral)   Resp 18   Ht 1.491 m (4' 10.7\")   Wt 79.4 kg (175 lb)   SpO2 99%   BMI 35.71 kg/m    Body mass index is 35.71 kg/m .    General: Alert and oriented, no acute distress  Psych: Mood stable  Cardio: RRR, no m/r/g  Pulm: CTAB  Abdomen: Soft, mildly tender to palpation, non-distended  Pelvic: Normal appearing external genitalia. Speculum exam with overall normal appearing cervix - nabothian cyst visible on cervix. Minimal blood in vaginal vault. Bimanual exam with firm palpable " posterior fibroid as well as fibroid palpable anteriorly on right side of abdomen.   Extremities: No edema, non-tender to palpation.      Assessment: Karen Dumont is a 52 year old woman with a dysfunctional uterine bleeding, s/p D & C showing endometrial polyp and endometrial hyperplasia with atypia.    Myomatous Uterus on ultrasound with 5 cm right lateal fibroid.    Plan:     1.)   Disease: Will plan to proceed with total abdominal hysterectomy with bilateral salpingo-oophorectomy at this time. Discussed risks vs benefit of removing ovaries and for this patient will plan to proceed with oophorectomy regardless of frozen pathology due to difficulty obtaining insurance and preference to avoid need for future procedures. Risks, benefits, and alternatives discussed including risk of bleeding, infection, and injury to nearby structures including bowel, bladder, ureters, and major nerves and vessels. Discussed that if frozen pathology returns positive for invasive cancer will need to perform lymph node dissection as well. Patient is agreeable to proceed.    Patient will see PAC clinic for pre-op H&P - physical portion of exam completed today. Will need baseline EKG, CXR, and labs (CBC, BMP)    She will need to establish PCP and mental health care       Gladys White MD  Gynecologic Oncology, PGY-3  09/08/2022 2:36 PM      Ling Nicole M.D., MPH,  F.A.C.O.G.  Professor  Department of Ob/Gyn and Women's Health  Division of Gynecologic Oncology  [facility] [place]  [phone]    Time: total time spent today, September 8, 2022, including preparation, review of outside records, face to face counseling, and documentation was 45 minutes.       I saw the patient with the resident.  I have reviewed and edited the resident's note and plan of care.

## 2022-09-08 NOTE — NURSING NOTE
EKG was performed today.  Order written by Dr Nicole was completed today. Name and  verified with patient.  Patient was checked into next appt.    Lona Quintana CMA (AAMA)

## 2022-09-08 NOTE — NURSING NOTE
Hysterectomy form completed and scanned into urgent HIM     Education folder given and full teaching will be completed at a later date     EKG/LAB/Chest XRAY completed today    Patient will see PAC for pre operative clearance     Yamilet Perez RN

## 2022-09-08 NOTE — LETTER
2022         RE: Karen Dumont  [address]  [place] [address]        Dear Colleague,    Thank you for referring your patient, Karen Dumont, to the [facility]. Please see a copy of my visit note below.    GYNECOLOGIC  ONCOLOGY CLINIC    Referring provider:    Linda Carroll MD  [facility]  [address]  [place] [address]   RE: Karen Dumont  : 1970  JOEL: 2022      CC: Dysfunctional Uterine Bleeding    HPI: Ms Karen Dumont is a 52 year old female who presents for treatment planning.    She presents to discuss surgery options.    She had previously been unable to have surgery as she did not have insurance, however she has since received insurance and would prefer to proceed with surgery at this time.    Bleeding was initially improved with Megace, but has increased significantly in the last week. She has been feeling quite dizzy in the last week as well. Has had a lot of abdominal pain and cramping as well. She has been taking the Megace regularly.     Work up to date  7/3//22 Pelvic US  FINDINGS:   Uterus: Anteverted uterus measuring 14.6 x 8.1 x 4.9 cm. Anterior fundal intramural fibroid measuring 5.3 x 2.8 x 4.6 cm. Anterior mid uterine body intramural/subserosal fibroid measuring 3.5 x 2.5 x 3.1 cm. Right uterine body intramural fibroid measuring 5.0 x 3.8 x 4.9 cm.   Endometrium: Endometrial stripe measures 56 mm. Heterogeneous echogenic endometrial lesion measuring approximately 4.5 x 3.6 x 3.5 cm with apparentvascular stalk.      D & C, reviewed at [facility] report 22  CASE FROM [facility], [place], [place] ([id number], OBTAINED 2022):  A.  Cervix, polypectomy:  -Cervical tissue with nabothian cyst     B.  Endometrium, curettage:  -Fragments of endometrial polyps featuring endometrial hyperplasia without atypia     22 Hg 14.5    OBGYN history and Health Maintenance:    Last Pap  "Smear: needs to obtain  Last Mammogram: none  Last Colonoscopy: none    Review of Systems:  Systemic:No weight changes.    Skin : No skin changes or new lesions.   Eye : No changes in vision.   Pulmonary: No cough or SOB.   Cardiovascular: No CP or palpitations  Gastrointestinal : No diarrhea, constipation, positive for abdominal pain. Bowel habits normal.   Genitourinary: No dysuria, urgency, positive for vaginal bleeding  Psychiatric: Positive for anxiety  Hematologic : No palpable lymph nodes.   Endocrine : No hot flashes. No heat/cold intolerance.      Neurological: No headaches, no numbness.     Past Medical History:   Diagnosis Date     Anxiety      Asthma      Bipolar affective disorder (H)      Essential hypertension      Gastroesophageal reflux disease without esophagitis      Migraine        Past Surgical History:   Procedure Laterality Date      SECTION     - via vertical midline skin incision       Current Outpatient Medications   Medication Sig Dispense Refill     albuterol (PROAIR HFA/PROVENTIL HFA/VENTOLIN HFA) 108 (90 Base) MCG/ACT inhaler Inhale 1-2 puffs into the lungs       megestrol (MEGACE) 40 MG tablet Take 2 tablets (80 mg) by mouth 2 times daily 84 tablet 0     Multiple Vitamins-Minerals (MULTIVITAMIN ADULT, MINERALS,) TABS Take 1 tablet by mouth           No Known Allergies    Social History:  Social History     Tobacco Use     Smoking status: Current Every Day Smoker     Types: Cigarettes     Smokeless tobacco: Never Used   Substance Use Topics     Alcohol use: Not Currently       Family History:   The patient's family history is notable for   No family history on file.      Physical Exam:     /79   Pulse 99   Temp 98.2  F (36.8  C) (Oral)   Resp 18   Ht 1.491 m (4' 10.7\")   Wt 79.4 kg (175 lb)   SpO2 99%   BMI 35.71 kg/m    Body mass index is 35.71 kg/m .    General: Alert and oriented, no acute distress  Psych: Mood stable  Cardio: RRR, no m/r/g  Pulm: CTAB  Abdomen: " Soft, mildly tender to palpation, non-distended  Pelvic: Normal appearing external genitalia. Speculum exam with overall normal appearing cervix - nabothian cyst visible on cervix. Minimal blood in vaginal vault. Bimanual exam with firm palpable posterior fibroid as well as fibroid palpable anteriorly on right side of abdomen.   Extremities: No edema, non-tender to palpation.      Assessment: Karen Dumont is a 52 year old woman with a dysfunctional uterine bleeding, s/p D & C showing endometrial polyp and endometrial hyperplasia with atypia.    Myomatous Uterus on ultrasound with 5 cm right lateal fibroid.    Plan:     1.)   Disease: Will plan to proceed with total abdominal hysterectomy with bilateral salpingo-oophorectomy at this time. Discussed risks vs benefit of removing ovaries and for this patient will plan to proceed with oophorectomy regardless of frozen pathology due to difficulty obtaining insurance and preference to avoid need for future procedures. Risks, benefits, and alternatives discussed including risk of bleeding, infection, and injury to nearby structures including bowel, bladder, ureters, and major nerves and vessels. Discussed that if frozen pathology returns positive for invasive cancer will need to perform lymph node dissection as well. Patient is agreeable to proceed.    Patient will see PAC clinic for pre-op H&P - physical portion of exam completed today. Will need baseline EKG, CXR, and labs (CBC, BMP)    She will need to establish PCP and mental health care       Gladys White MD  Gynecologic Oncology, PGY-3  09/08/2022 2:36 PM      Ling Nicole M.D., MPH,  F.A.C.O.G.  Professor  Department of Ob/Gyn and Women's Health  Division of Gynecologic Oncology  [facility]/[employer]  [phone]    Time: total time spent today, September 8, 2022, including preparation, review of outside records, face to face counseling, and documentation was 45 minutes.            Again, thank you for allowing me to participate in the care of your patient.        Sincerely,        Ling Nicole MD

## 2022-09-09 ENCOUNTER — TELEPHONE (OUTPATIENT)
Dept: ONCOLOGY | Facility: CLINIC | Age: 52
End: 2022-09-09

## 2022-09-09 LAB
ATRIAL RATE - MUSE: 75 BPM
DIASTOLIC BLOOD PRESSURE - MUSE: NORMAL MMHG
INTERPRETATION ECG - MUSE: NORMAL
P AXIS - MUSE: 26 DEGREES
PR INTERVAL - MUSE: 114 MS
QRS DURATION - MUSE: 84 MS
QT - MUSE: 378 MS
QTC - MUSE: 422 MS
R AXIS - MUSE: 0 DEGREES
SYSTOLIC BLOOD PRESSURE - MUSE: NORMAL MMHG
T AXIS - MUSE: -3 DEGREES
VENTRICULAR RATE- MUSE: 75 BPM

## 2022-09-09 NOTE — TELEPHONE ENCOUNTER
FUTURE VISIT INFORMATION      SURGERY INFORMATION:    Date: 10/3/22    Location: uu or    Surgeon:  Ling Nicole MD    Anesthesia Type:  general    Procedure: TOTAL  ABDOMINAL HYSTERECTOMY, WITH BILATERAL SALPINGO-OOPHORECTOMY, POSSIBLE LYMPH NODE DISSECTION     Consult: ov     RECORDS REQUESTED FROM:       Primary Care Provider: Lloyd Peterson Od, OD    Most recent EKG+ Tracin/8

## 2022-09-09 NOTE — TELEPHONE ENCOUNTER
RN Care Coordinator: Nydia Perez; [phone]    Surgery is scheduled with Dr. Nicole on 10/3 at the [facility]  Scheduled per surgeon request    H&P to be completed by PAC clinic on 9/14     COVID-19 test: will be at PCP office - patient to schedule and let clinic know if orders need to be faxed      Post-op: 10/6, in person visit    Patient will receive surgery arrival and start time from PAC.    I spoke with the patient and was able to confirm the scheduled information. No further action needed at this time.    Surgery packet was provided by the RNCC during appointment

## 2022-09-14 ENCOUNTER — ANESTHESIA EVENT (OUTPATIENT)
Dept: SURGERY | Facility: CLINIC | Age: 52
End: 2022-09-14

## 2022-09-14 ENCOUNTER — PATIENT OUTREACH (OUTPATIENT)
Dept: ONCOLOGY | Facility: CLINIC | Age: 52
End: 2022-09-14

## 2022-09-14 ENCOUNTER — PRE VISIT (OUTPATIENT)
Dept: SURGERY | Facility: CLINIC | Age: 52
End: 2022-09-14

## 2022-09-14 ENCOUNTER — VIRTUAL VISIT (OUTPATIENT)
Dept: SURGERY | Facility: CLINIC | Age: 52
End: 2022-09-14
Attending: OBSTETRICS & GYNECOLOGY
Payer: MEDICAID

## 2022-09-14 DIAGNOSIS — N93.8 DYSFUNCTIONAL UTERINE BLEEDING: ICD-10-CM

## 2022-09-14 DIAGNOSIS — N85.02 EIN (ENDOMETRIAL INTRAEPITHELIAL NEOPLASIA): ICD-10-CM

## 2022-09-14 DIAGNOSIS — Z01.818 PRE-OP EXAMINATION: Primary | ICD-10-CM

## 2022-09-14 DIAGNOSIS — F41.9 ANXIETY: ICD-10-CM

## 2022-09-14 PROCEDURE — 99204 OFFICE O/P NEW MOD 45 MIN: CPT | Mod: 95 | Performed by: PHYSICIAN ASSISTANT

## 2022-09-14 RX ORDER — LACTOBACILLUS RHAMNOSUS GG 10B CELL
1 CAPSULE ORAL DAILY
COMMUNITY

## 2022-09-14 RX ORDER — MEGESTROL ACETATE 40 MG/1
80 TABLET ORAL 2 TIMES DAILY
Qty: 84 TABLET | Refills: 0 | Status: ON HOLD | OUTPATIENT
Start: 2022-09-14 | End: 2022-10-05

## 2022-09-14 RX ORDER — VIT C/B6/B5/MAGNESIUM/HERB 173 50-5-6-5MG
CAPSULE ORAL DAILY
Status: ON HOLD | COMMUNITY
End: 2022-10-03

## 2022-09-14 RX ORDER — ACETAMINOPHEN 325 MG/1
325-650 TABLET ORAL EVERY 6 HOURS PRN
Status: ON HOLD | COMMUNITY
End: 2022-10-05

## 2022-09-14 RX ORDER — IBUPROFEN 200 MG
200 TABLET ORAL EVERY 4 HOURS PRN
Status: ON HOLD | COMMUNITY
End: 2022-10-05

## 2022-09-14 RX ORDER — PENICILLIN V POTASSIUM 500 MG/1
500 TABLET, FILM COATED ORAL 3 TIMES DAILY
COMMUNITY
Start: 2022-09-07 | End: 2022-09-14

## 2022-09-14 RX ORDER — HYDROCODONE BITARTRATE AND ACETAMINOPHEN 5; 325 MG/1; MG/1
1-2 TABLET ORAL
Status: ON HOLD | COMMUNITY
Start: 2022-09-07 | End: 2022-10-03

## 2022-09-14 ASSESSMENT — LIFESTYLE VARIABLES: TOBACCO_USE: 1

## 2022-09-14 ASSESSMENT — PAIN SCALES - GENERAL: PAINLEVEL: SEVERE PAIN (7)

## 2022-09-14 NOTE — TELEPHONE ENCOUNTER
Medication: Megace 40 MG tablet (megestrol)  Last prescribing provider: Dr. Nicole on 8/24/22    Last clinic visit date: 9/8/22    Any missed appointments or no-shows since last clinic visit?: No    Recommendations for requested medication (if none, N/A): NA    Next clinic visit date: Surgery on 10/3/22 and follow up appointment on 10/18/22    Any other pertinent information (if none, N/A): NA    Pended and Routed to Provider.

## 2022-09-14 NOTE — PATIENT INSTRUCTIONS
Preparing for Your Surgery      Name:  Karen Dumont   MRN:  [id number]   :  1970   Today's Date:  2022       Arriving for surgery:  Surgery date:  10/3/22  Arrival time:  10:00 am       Visiting hours: 8 a.m. to 8:30 p.m.     Hospital: Adult patients and children (under age 18 can have 4 visitor at a time     No visitors under the age of 5 are allowed for hospital patients.     Surgeries and procedures: Adult patients can have 2 visitors all through the surgery process.     Patients with disabilities: Can have a support person with them (family member, service provider     Or someone well informed about their needs) plus the allowed number of visitors     Patients confirmed or suspected to have symptoms of COVID 19 or flu:     No visitors allowed for adult patients.   Children (under age 18) can have 1 named visitor.     People who are sick or showing symptoms of COVID 19 or flu:    Are not allowed to visit patients--we can only make exceptions in special situations.       Please follow these guidelines for your visit:   Arrive wearing a mask over your mouth and nose; we will give you a medical mask to wear    If you arrive wearing a cloth mask.   Keep it on during your entire visit, even when in patient's room.   If you don't wear a mask we'll ask you to leave.     Clean your hands with alcohol hand . Do this when you arrive at and leave the building and patient room,    And again after you touch your mask or anything in the room.     You can t visit if you have a fever, cough, shortness of breath, muscle aches, headaches, sore throat    Or diarrhea      Stay 6 feet away from others during your visit and between visits     Go directly to and from the room you are visiting.     Stay in the patient s room during your visit. Limit going to other places in the hospital as much as possible     Leave bags and jackets at home or in the car.     For everyone s health, please don t come and go  during your visit. That includes for smoking   during your visit. That includes for smoking    Please come to:     [facility] [address]  [address] [place]  [address]    - ? parking is available in front of the hospital      -    Please proceed to [address] on the 3rd floor. [phone]?     - ?If you are in need of directions, wheelchair or escort please stop at the Information Desk in the lobby.  Inform the information person that you are here for surgery; a wheelchair and escort to [address] will be provided.?     What can I eat or drink?  -  You may eat and drink normally for up to 8 hours before your surgery.   -  You may have clear liquids until 2 hours before surgery.     Examples of clear liquids:  Water  Clear broth  Juices (apple, white grape, white cranberry  and cider) without pulp  Noncarbonated, powder based beverages  (lemonade and Skip-Aid)  Sodas (Sprite, 7-Up, ginger ale and seltzer)  Coffee or tea (without milk or cream)  Gatorade    -  No Alcohol for at least 24 hours before surgery.     Which medicines can I take?  Hold Aspirin for 7 days before surgery.   Hold Multivitamins for 7 days before surgery.  Hold Supplements (tumeric) for 7 days before surgery.  Hold Ibuprofen (Advil, Motrin) for 1 day before surgery--unless otherwise directed by surgeon.  Hold Naproxen (Aleve) for 4 days before surgery.    -  PLEASE TAKE these medications the day of surgery:  Tylenol if needed; take other morning medications.    How do I prepare myself?  - Please take 2 showers before surgery using Scrubcare or Hibiclens soap.    Use this soap only from the neck to your toes.     Leave the soap on your skin for one minute--then rinse thoroughly.      You may use your own shampoo and conditioner. No other hair products.   - Please remove all jewelry and body piercings.  - No lotions, deodorants or fragrance.  - No makeup or fingernail polish.   -  Bring your ID and insurance card.    -If you have a Deep Brain Stimulator, Spinal Cord Stimulator, or any Neuro Stimulator device---you must bring the remote control to the hospital.          Questions or Concerns:    - For any questions regarding the day of surgery or your hospital stay, please contact the Pre Admission Nursing Office at [phone].       - If you have health changes between today and your surgery, please call your surgeon.       - For questions after surgery, please call your surgeons office.

## 2022-09-14 NOTE — PROGRESS NOTES
Patient was asked to reach about needing bowel prep prior to surgery.     NO bowel pre needed.     RN answered all questions to the best of her ability     Patient appreciative of the RN information and time         Yamilet Perez RN

## 2022-09-14 NOTE — H&P
Pre-Operative H & P     ADDENDUM:  I called the patient back as she wanted our team to know that in the past with her psych medications she has been on Ambien, melatonin and lithium. She reports even taking ambien she would get up in the night and clean the house without knowing it. She is very anxious about waking up during surgery and wants the anesthesia team to know she has a high tolerance.     The patient also had questions about dentists. She had her tooth pulled and the ER told her the root might still be there. She has no infection symptoms. Discussed if she does develop symptoms as she hasn't been able to get into see a dentist that she needs to go back to the ER.     CC:  Preoperative exam to assess for increased cardiopulmonary risk while undergoing surgery and anesthesia.    Date of Encounter: 9/14/2022  Primary Care Physician:  Lloyd Peterson Od     Reason for visit:   Encounter Diagnoses   Name Primary?     Pre-op examination Yes     EIN (endometrial intraepithelial neoplasia)      Anxiety        HPI  Karen Dumont is a 52 year old female who presents for pre-operative H & P in preparation for  Procedure Information     Date/Time: 10/3/22     Procedure: TOTAL  ABDOMINAL HYSTERECTOMY, WITH BILATERAL SALPINGO-OOPHORECTOMY, POSSIBLE LYMPH NODE DISSECTION      Anesthesia type: General    Pre-op diagnosis: EIN    Location: [facility]    Providers: Dr. Nicole          The patient is a 52-year-old woman with a past medical history significant for hypertension, asthma, tobacco user, cerebral palsy, GERD, bipolar disorder, anxiety, insomnia, chronic pain and newly found endometrial intraepithelial neoplasm.  The patient was seen by Dr. Nicole on 9/8/2022 and they discussed surgical treatment options.  The patient is now scheduled for the procedure as above.     History is obtained from the patient and chart review    Hx of abnormal bleeding or  anti-platelet use: none    Menstrual history: No LMP recorded. Patient is postmenopausal.:      Past Medical History  Past Medical History:   Diagnosis Date     Anxiety      Asthma      Bipolar affective disorder (H)      Cerebral palsy (H)      EIN (endometrial intraepithelial neoplasia)      Essential hypertension      Gastroesophageal reflux disease without esophagitis      Migraine        Past Surgical History  Past Surgical History:   Procedure Laterality Date      SECTION      Via vertical midline skin incision     hysteroscopy and dilation and Curettage, removal endocervical polyp   2022       Prior to Admission Medications  Current Outpatient Medications   Medication Sig Dispense Refill     acetaminophen (TYLENOL) 325 MG tablet Take 325-650 mg by mouth every 6 hours as needed for mild pain       albuterol (PROAIR HFA/PROVENTIL HFA/VENTOLIN HFA) 108 (90 Base) MCG/ACT inhaler Inhale 1-2 puffs into the lungs every 6 hours as needed       ibuprofen (ADVIL/MOTRIN) 200 MG tablet Take 200 mg by mouth every 4 hours as needed for mild pain       lactobacillus rhamnosus, GG, (CULTURELL) capsule Take 1 capsule by mouth daily       megestrol (MEGACE) 40 MG tablet Take 2 tablets (80 mg) by mouth 2 times daily 84 tablet 0     Multiple Vitamins-Minerals (MULTIVITAMIN ADULT, MINERALS,) TABS Take 1 tablet by mouth daily       penicillin V (VEETID) 500 MG tablet Take 500 mg by mouth 3 times daily       Turmeric 500 MG CAPS Take by mouth daily       HYDROcodone-acetaminophen (NORCO) 5-325 MG tablet Take 1-2 tablets by mouth (Patient not taking: Reported on 2022)         Allergies  No Known Allergies    Social History  Social History     Socioeconomic History     Marital status: Patient Declined     Spouse name: Not on file     Number of children: Not on file     Years of education: Not on file     Highest education level: Not on file   Occupational History     Not on file   Tobacco Use     Smoking status:  Current Every Day Smoker     Types: Cigarettes     Smokeless tobacco: Never Used     Tobacco comment: 2 cigarettes per day   Substance and Sexual Activity     Alcohol use: Not Currently     Drug use: Not Currently     Sexual activity: Not Currently   Other Topics Concern     Not on file   Social History Narrative     Not on file     Social Determinants of Health     Financial Resource Strain: Not on file   Food Insecurity: Not on file   Transportation Needs: Not on file   Physical Activity: Not on file   Stress: Not on file   Social Connections: Not on file   Intimate Partner Violence: Not on file   Housing Stability: Not on file       Family History  Family History   Problem Relation Age of Onset     Anesthesia Reaction No family hx of      Deep Vein Thrombosis (DVT) No family hx of        Review of Systems  The complete review of systems is negative other than noted in the HPI or here.   Anesthesia Evaluation   Pt has had prior anesthetic. Type: MAC.    No history of anesthetic complications       ROS/MED HX  ENT/Pulmonary:     (+) tobacco use, Current use, Intermittent, asthma     Neurologic: Comment: The patient reports she has cerebral palsy on the right side which is very mild but causes right sided weakness and a limp as she is unable to move her toes and foot. She reports 65% function on the right side.       Cardiovascular:     (+) hypertension-----Previous cardiac testing   Echo: Date: Results:    Stress Test: Date: Results:    ECG Reviewed: Date: 9/8/22 Results:  SR  Cath: Date: Results:      METS/Exercise Tolerance: 4 - Raking leaves, gardening    Hematologic:  - neg hematologic  ROS     Musculoskeletal:  - neg musculoskeletal ROS     GI/Hepatic:     (+) GERD, Symptomatic,     Renal/Genitourinary: Comment: EIN/ dysfunction uterine bleeding       Endo:     (+) Obesity,     Psychiatric/Substance Use:     (+) psychiatric history bipolar, anxiety and other (comment) (insomnia)     Infectious Disease:        Malignancy:       Other:  - neg other ROS          Virtual visit -  No vitals were obtained    Physical Exam  Constitutional: Awake, alert, cooperative, no apparent distress, and appears stated age.  Eyes: Pupils equal  HENT: Normocephalic  Respiratory: non labored breathing   Neurologic: Awake, alert, oriented to name, place and time.   Neuropsychiatric: Calm, cooperative. Tearful and anxious     Prior Labs/Diagnostic Studies   All labs and imaging personally reviewed    Latest Reference Range & Units 09/08/22 17:15   WBC 4.0 - 11.0 10e3/uL 11.5 (H)   Hemoglobin 11.7 - 15.7 g/dL 15.0   Hematocrit 35.0 - 47.0 % 44.3   Platelet Count 150 - 450 10e3/uL 237   RBC Count 3.80 - 5.20 10e6/uL 5.00   MCV 78 - 100 fL 89   MCH 26.5 - 33.0 pg 30.0   MCHC 31.5 - 36.5 g/dL 33.9   RDW 10.0 - 15.0 % 13.5   ABO/Rh(D)  B POS   Antibody Screen Negative  Negative   SPECIMEN EXPIRATION DATE  [id number]      Latest Reference Range & Units 08/04/22 14:01   Sodium 136 - 145 mmol/L 138   Potassium 3.4 - 5.3 mmol/L 3.6   Chloride 98 - 107 mmol/L 106   Carbon Dioxide (CO2) 22 - 29 mmol/L 21 (L)   Urea Nitrogen 6.0 - 20.0 mg/dL 4.9 (L)   Creatinine 0.51 - 0.95 mg/dL 0.51   GFR Estimate >60 mL/min/1.73m2 >90   Calcium 8.6 - 10.0 mg/dL 9.4   Anion Gap 7 - 15 mmol/L 11   CRP Inflammation <5.00 mg/L 67.90 (H)   Glucose 70 - 99 mg/dL 96   Procalcitonin <0.05 ng/mL 0.06 (H)       EKG/ stress test - if available please see in ROS above       The patient's records and results personally reviewed by this provider.     Outside records reviewed from: Care Everywhere      Assessment      Karen Dumont is a 52 year old female seen as a PAC referral for risk assessment and optimization for anesthesia.    Plan/Recommendations  Pt will be optimized for the proposed procedure.  See below for details on the assessment, risk, and preoperative recommendations    NEUROLOGY  ~ Patient reports her RLS and migraines are resolved  ~ The patient reports  to me today that she has been diagnosed with cerebral palsy since a child and has right sided weakness. She cannot move her toes or foot on her right foot and this causes a limp. She reports she has 65% function on the right side.   -Post Op delirium risk factors:  No risk identified    ENT  - No current airway concerns.  Will need to be reassessed day of surgery.  Mallampati: Unable to assess  TM: Unable to assess  ~ The patient will be having a dental procedure tomorrow which will be her last dose of penicillin.     CARDIAC  - Hypertension - not on medications. Last blood pressure was from the ED on 9/7/22 with reading of 165/77. Other reading from Dr. Nicole visit on 8/17/22 at 148/88. The patient had EKG completed on that day with sinus rhythm. She reports she is active doing yard work, going up stairs and walking. She notes more vaginal bleeding with walking.   ~ She report dizziness but feels it might be due to her anxiety with diagnosis and not sleeping. She had hgb on 9/8/22 at 15.0, normal sinus EKG and is able to eat and drink with staying hydrated. She has not had LOC or falls. She denies cardiac symptoms of chest pain, tightness or shortness of breath.   - METS (Metabolic Equivalents)  Patient performs 4 or more METS exercise without symptoms            Total Score: 0      RCRI-Very low risk: Class 1 0.4% complication rate            Total Score: 0        PULMONARY  - Obstructive Sleep Apnea  No current risk of obstructive sleep apnea   MINNIE Low Risk            Total Score: 2    MINNIE: BMI over 35 kg/m2    MINNIE: Over 50 ys old      - Asthma  Well controlled  - Tobacco History      History   Smoking Status     Current Every Day Smoker     Types: Cigarettes   Smokeless Tobacco     Never Used     Comment: 2 cigarettes per day   ~ Discussed smoking cessation for the day of surgery. The patient is down to smoking 2 cigarettes per day.     GI  - GERD  Not controlled on medications - consideration for RSI or  "crystal.   PONV Medium Risk  Total Score: 2           1 AN PONV: Pt is Female    1 AN PONV: Intended Post Op Opioids        /RENAL  - Baseline Creatinine  0.51  ~ EIN - the patient continue Megace and reports still is having spotting/ clotting/ bleeding. She has discussed this with Dr. Nicole and was advised to reach out to her surgical team if her symptoms changed.     ENDOCRINE    - BMI: Estimated body mass index is 35.71 kg/m  as calculated from the following:    Height as of 9/8/22: 1.491 m (4' 10.7\").    Weight as of 9/8/22: 79.4 kg (175 lb).  Obesity (BMI >30)  - No history of Diabetes Mellitus    HEME  VTE Low Risk 0.26%            Total Score: 0      - No history of abnormal bleeding or antiplatelet use.      PSYCH  - bipolar disorder, anxiety, insomnia - The patient reports she has been on medications in the past but hasn't been on anything for 5-10 years. She isn't interested in medications to treat her symptoms at this time. We did discuss a referral for social work to help discuss her ongoing anxiety/concerns with her new diagnosis.       The patient is optimized for their procedure. AVS with information on surgery time/arrival time, meds and NPO status given by nursing staff. No further diagnostic testing indicated.    Please refer to the physical examination documented by the anesthesiologist in the anesthesia record on the day of surgery.    Video-Visit Details    Type of service:  Video Visit    Patient verbally consented to video service today: YES    Video Start Time: 11:10  Video End Time (time video stopped): 11:26    Originating Location (pt. Location): Home    Distant Location (provider location):  [facility]     Mode of Communication:  Video Conference via [employer]  On the day of service:     Prep time: 13 minutes  Visit time: 16 minutes  Documentation time: 17 minutes  ------------------------------------------  Total time: 46 minutes      Carla Schwab, " PAUL  Preoperative Assessment Center  [facility]  Clinic and Surgery Center  Phone: [phone]  Fax: [phone]

## 2022-09-15 RX ORDER — MEGESTROL ACETATE 40 MG/1
TABLET ORAL
Qty: 84 TABLET | Refills: 0 | OUTPATIENT
Start: 2022-09-15

## 2022-09-16 DIAGNOSIS — N85.02 EIN (ENDOMETRIAL INTRAEPITHELIAL NEOPLASIA): Primary | ICD-10-CM

## 2022-09-16 NOTE — PROGRESS NOTES
Patient called wanting COVID order faxed to local team    Patient has questions and concerns about anesthesia. Patient would like to see PAC to discuss these.     Needed PAC order placed. Appointment requested.    COVID order placed and faxed.     Yamilet Perez RN

## 2022-09-21 LAB
BLOOD BANK CHART COMMENT: NORMAL
SPECIMEN EXPIRATION DATE: NORMAL

## 2022-09-23 ENCOUNTER — TELEPHONE (OUTPATIENT)
Dept: ONCOLOGY | Facility: CLINIC | Age: 52
End: 2022-09-23

## 2022-09-23 NOTE — TELEPHONE ENCOUNTER
Karen calls in stating that she needs the fax number to send the Covid Results.   Please call [facility] [phone] for Ryan Jang to give them The fax number for the results.    If patient could get a call back when this is taken care of so she is aware she says that would be helpful.

## 2022-09-23 NOTE — TELEPHONE ENCOUNTER
RN attempted to reach the [place] [facility] via the numbler given. However, phone would ring and then hang up. RN attempted several calls     RN reached out to patient with update and needed fax number    Patient had questions RN addressed to the best of her ability    Patient appreciative of RN time     Yamilet Perez RN

## 2022-09-28 ENCOUNTER — PATIENT OUTREACH (OUTPATIENT)
Dept: ONCOLOGY | Facility: CLINIC | Age: 52
End: 2022-09-28

## 2022-09-28 NOTE — PROGRESS NOTES
Patient reached out with several questions about surgery     RN answered these to the best of her ability    Patient appreciative of RN time    Yamilet Perez RN

## 2022-09-30 ENCOUNTER — ANESTHESIA EVENT (OUTPATIENT)
Dept: SURGERY | Facility: CLINIC | Age: 52
DRG: 743 | End: 2022-09-30
Payer: MEDICARE

## 2022-10-03 ENCOUNTER — HOSPITAL ENCOUNTER (INPATIENT)
Facility: CLINIC | Age: 52
LOS: 2 days | Discharge: HOME | DRG: 743 | End: 2022-10-05
Attending: OBSTETRICS & GYNECOLOGY | Admitting: OBSTETRICS & GYNECOLOGY
Payer: MEDICARE

## 2022-10-03 ENCOUNTER — ANESTHESIA (OUTPATIENT)
Dept: SURGERY | Facility: CLINIC | Age: 52
DRG: 743 | End: 2022-10-03
Payer: MEDICARE

## 2022-10-03 DIAGNOSIS — N85.02 EIN (ENDOMETRIAL INTRAEPITHELIAL NEOPLASIA): ICD-10-CM

## 2022-10-03 DIAGNOSIS — Z90.710 S/P HYSTERECTOMY: Primary | ICD-10-CM

## 2022-10-03 LAB
ABO/RH(D): NORMAL
ANTIBODY SCREEN: NEGATIVE
GLUCOSE BLDC GLUCOMTR-MCNC: 95 MG/DL (ref 70–99)
HCG INTACT+B SERPL-ACNC: <1 MIU/ML
HGB BLD-MCNC: 14.5 G/DL (ref 11.7–15.7)
SPECIMEN EXPIRATION DATE: NORMAL

## 2022-10-03 PROCEDURE — 258N000003 HC RX IP 258 OP 636: Performed by: ANESTHESIOLOGY

## 2022-10-03 PROCEDURE — C9290 INJ, BUPIVACAINE LIPOSOME: HCPCS | Performed by: ANESTHESIOLOGY

## 2022-10-03 PROCEDURE — 999N000141 HC STATISTIC PRE-PROCEDURE NURSING ASSESSMENT: Performed by: OBSTETRICS & GYNECOLOGY

## 2022-10-03 PROCEDURE — 250N000011 HC RX IP 250 OP 636

## 2022-10-03 PROCEDURE — 250N000013 HC RX MED GY IP 250 OP 250 PS 637: Performed by: OBSTETRICS & GYNECOLOGY

## 2022-10-03 PROCEDURE — 250N000009 HC RX 250: Performed by: NURSE ANESTHETIST, CERTIFIED REGISTERED

## 2022-10-03 PROCEDURE — 0UT70ZZ RESECTION OF BILATERAL FALLOPIAN TUBES, OPEN APPROACH: ICD-10-PCS | Performed by: OBSTETRICS & GYNECOLOGY

## 2022-10-03 PROCEDURE — 88305 TISSUE EXAM BY PATHOLOGIST: CPT | Mod: TC | Performed by: OBSTETRICS & GYNECOLOGY

## 2022-10-03 PROCEDURE — 86850 RBC ANTIBODY SCREEN: CPT | Performed by: OBSTETRICS & GYNECOLOGY

## 2022-10-03 PROCEDURE — 250N000011 HC RX IP 250 OP 636: Performed by: OBSTETRICS & GYNECOLOGY

## 2022-10-03 PROCEDURE — 370N000017 HC ANESTHESIA TECHNICAL FEE, PER MIN: Performed by: OBSTETRICS & GYNECOLOGY

## 2022-10-03 PROCEDURE — 120N000002 HC R&B MED SURG/OB UMMC

## 2022-10-03 PROCEDURE — 272N000001 HC OR GENERAL SUPPLY STERILE: Performed by: OBSTETRICS & GYNECOLOGY

## 2022-10-03 PROCEDURE — 250N000011 HC RX IP 250 OP 636: Performed by: ANESTHESIOLOGY

## 2022-10-03 PROCEDURE — 86901 BLOOD TYPING SEROLOGIC RH(D): CPT | Performed by: OBSTETRICS & GYNECOLOGY

## 2022-10-03 PROCEDURE — 258N000003 HC RX IP 258 OP 636: Performed by: STUDENT IN AN ORGANIZED HEALTH CARE EDUCATION/TRAINING PROGRAM

## 2022-10-03 PROCEDURE — 84702 CHORIONIC GONADOTROPIN TEST: CPT | Performed by: OBSTETRICS & GYNECOLOGY

## 2022-10-03 PROCEDURE — 250N000025 HC SEVOFLURANE, PER MIN: Performed by: OBSTETRICS & GYNECOLOGY

## 2022-10-03 PROCEDURE — 250N000011 HC RX IP 250 OP 636: Performed by: STUDENT IN AN ORGANIZED HEALTH CARE EDUCATION/TRAINING PROGRAM

## 2022-10-03 PROCEDURE — 250N000013 HC RX MED GY IP 250 OP 250 PS 637: Performed by: STUDENT IN AN ORGANIZED HEALTH CARE EDUCATION/TRAINING PROGRAM

## 2022-10-03 PROCEDURE — 88331 PATH CONSLTJ SURG 1 BLK 1SPC: CPT | Mod: TC | Performed by: OBSTETRICS & GYNECOLOGY

## 2022-10-03 PROCEDURE — 710N000010 HC RECOVERY PHASE 1, LEVEL 2, PER MIN: Performed by: OBSTETRICS & GYNECOLOGY

## 2022-10-03 PROCEDURE — 360N000077 HC SURGERY LEVEL 4, PER MIN: Performed by: OBSTETRICS & GYNECOLOGY

## 2022-10-03 PROCEDURE — 85018 HEMOGLOBIN: CPT | Performed by: OBSTETRICS & GYNECOLOGY

## 2022-10-03 PROCEDURE — 250N000009 HC RX 250: Performed by: ANESTHESIOLOGY

## 2022-10-03 PROCEDURE — 0UT90ZZ RESECTION OF UTERUS, OPEN APPROACH: ICD-10-PCS | Performed by: OBSTETRICS & GYNECOLOGY

## 2022-10-03 PROCEDURE — 36415 COLL VENOUS BLD VENIPUNCTURE: CPT | Performed by: OBSTETRICS & GYNECOLOGY

## 2022-10-03 PROCEDURE — 0UT20ZZ RESECTION OF BILATERAL OVARIES, OPEN APPROACH: ICD-10-PCS | Performed by: OBSTETRICS & GYNECOLOGY

## 2022-10-03 RX ORDER — FLUMAZENIL 0.1 MG/ML
0.2 INJECTION, SOLUTION INTRAVENOUS
Status: DISCONTINUED | OUTPATIENT
Start: 2022-10-03 | End: 2022-10-03 | Stop reason: HOSPADM

## 2022-10-03 RX ORDER — METHOCARBAMOL 500 MG/1
500 TABLET, FILM COATED ORAL EVERY 6 HOURS PRN
Status: DISCONTINUED | OUTPATIENT
Start: 2022-10-03 | End: 2022-10-06 | Stop reason: HOSPADM

## 2022-10-03 RX ORDER — NALOXONE HYDROCHLORIDE 0.4 MG/ML
0.4 INJECTION, SOLUTION INTRAMUSCULAR; INTRAVENOUS; SUBCUTANEOUS
Status: DISCONTINUED | OUTPATIENT
Start: 2022-10-03 | End: 2022-10-06 | Stop reason: HOSPADM

## 2022-10-03 RX ORDER — SODIUM CHLORIDE, SODIUM LACTATE, POTASSIUM CHLORIDE, CALCIUM CHLORIDE 600; 310; 30; 20 MG/100ML; MG/100ML; MG/100ML; MG/100ML
500 INJECTION, SOLUTION INTRAVENOUS CONTINUOUS
Status: DISCONTINUED | OUTPATIENT
Start: 2022-10-03 | End: 2022-10-03 | Stop reason: HOSPADM

## 2022-10-03 RX ORDER — FAMOTIDINE 20 MG/1
20 TABLET, FILM COATED ORAL 2 TIMES DAILY
Status: DISCONTINUED | OUTPATIENT
Start: 2022-10-03 | End: 2022-10-06 | Stop reason: HOSPADM

## 2022-10-03 RX ORDER — SIMETHICONE 80 MG
80 TABLET,CHEWABLE ORAL 4 TIMES DAILY PRN
Status: DISCONTINUED | OUTPATIENT
Start: 2022-10-03 | End: 2022-10-06 | Stop reason: HOSPADM

## 2022-10-03 RX ORDER — HYDROXYZINE HYDROCHLORIDE 25 MG/1
25 TABLET, FILM COATED ORAL EVERY 6 HOURS PRN
Status: DISCONTINUED | OUTPATIENT
Start: 2022-10-03 | End: 2022-10-06 | Stop reason: HOSPADM

## 2022-10-03 RX ORDER — PROPOFOL 10 MG/ML
INJECTION, EMULSION INTRAVENOUS PRN
Status: DISCONTINUED | OUTPATIENT
Start: 2022-10-03 | End: 2022-10-03

## 2022-10-03 RX ORDER — HYDRALAZINE HYDROCHLORIDE 20 MG/ML
10 INJECTION INTRAMUSCULAR; INTRAVENOUS EVERY 6 HOURS PRN
Status: DISCONTINUED | OUTPATIENT
Start: 2022-10-03 | End: 2022-10-03

## 2022-10-03 RX ORDER — CEFAZOLIN SODIUM/WATER 2 G/20 ML
2 SYRINGE (ML) INTRAVENOUS
Status: COMPLETED | OUTPATIENT
Start: 2022-10-03 | End: 2022-10-03

## 2022-10-03 RX ORDER — ONDANSETRON 4 MG/1
4 TABLET, ORALLY DISINTEGRATING ORAL EVERY 6 HOURS PRN
Status: DISCONTINUED | OUTPATIENT
Start: 2022-10-03 | End: 2022-10-06 | Stop reason: HOSPADM

## 2022-10-03 RX ORDER — LIDOCAINE 40 MG/G
CREAM TOPICAL
Status: DISCONTINUED | OUTPATIENT
Start: 2022-10-03 | End: 2022-10-04

## 2022-10-03 RX ORDER — HYDROMORPHONE HYDROCHLORIDE 1 MG/ML
0.2 INJECTION, SOLUTION INTRAMUSCULAR; INTRAVENOUS; SUBCUTANEOUS EVERY 5 MIN PRN
Status: DISCONTINUED | OUTPATIENT
Start: 2022-10-03 | End: 2022-10-03 | Stop reason: HOSPADM

## 2022-10-03 RX ORDER — FENTANYL CITRATE 50 UG/ML
25 INJECTION, SOLUTION INTRAMUSCULAR; INTRAVENOUS EVERY 5 MIN PRN
Status: DISCONTINUED | OUTPATIENT
Start: 2022-10-03 | End: 2022-10-03 | Stop reason: HOSPADM

## 2022-10-03 RX ORDER — HEPARIN SODIUM 5000 [USP'U]/.5ML
5000 INJECTION, SOLUTION INTRAVENOUS; SUBCUTANEOUS
Status: COMPLETED | OUTPATIENT
Start: 2022-10-03 | End: 2022-10-03

## 2022-10-03 RX ORDER — NALOXONE HYDROCHLORIDE 0.4 MG/ML
0.2 INJECTION, SOLUTION INTRAMUSCULAR; INTRAVENOUS; SUBCUTANEOUS
Status: DISCONTINUED | OUTPATIENT
Start: 2022-10-03 | End: 2022-10-03

## 2022-10-03 RX ORDER — PROCHLORPERAZINE 25 MG
25 SUPPOSITORY, RECTAL RECTAL ONCE
Status: COMPLETED | OUTPATIENT
Start: 2022-10-03 | End: 2022-10-03

## 2022-10-03 RX ORDER — LIDOCAINE 4 G/G
1 PATCH TOPICAL DAILY
Status: DISCONTINUED | OUTPATIENT
Start: 2022-10-03 | End: 2022-10-06 | Stop reason: HOSPADM

## 2022-10-03 RX ORDER — OXYCODONE HYDROCHLORIDE 10 MG/1
10 TABLET ORAL EVERY 4 HOURS PRN
Status: DISCONTINUED | OUTPATIENT
Start: 2022-10-03 | End: 2022-10-03

## 2022-10-03 RX ORDER — BISACODYL 10 MG
10 SUPPOSITORY, RECTAL RECTAL DAILY
Status: DISCONTINUED | OUTPATIENT
Start: 2022-10-03 | End: 2022-10-06 | Stop reason: HOSPADM

## 2022-10-03 RX ORDER — SODIUM CHLORIDE, SODIUM LACTATE, POTASSIUM CHLORIDE, CALCIUM CHLORIDE 600; 310; 30; 20 MG/100ML; MG/100ML; MG/100ML; MG/100ML
INJECTION, SOLUTION INTRAVENOUS CONTINUOUS
Status: DISCONTINUED | OUTPATIENT
Start: 2022-10-03 | End: 2022-10-03 | Stop reason: HOSPADM

## 2022-10-03 RX ORDER — CALCIUM CARBONATE 500 MG/1
500 TABLET, CHEWABLE ORAL 4 TIMES DAILY PRN
Status: DISCONTINUED | OUTPATIENT
Start: 2022-10-03 | End: 2022-10-06 | Stop reason: HOSPADM

## 2022-10-03 RX ORDER — NALOXONE HYDROCHLORIDE 0.4 MG/ML
0.4 INJECTION, SOLUTION INTRAMUSCULAR; INTRAVENOUS; SUBCUTANEOUS
Status: DISCONTINUED | OUTPATIENT
Start: 2022-10-03 | End: 2022-10-03

## 2022-10-03 RX ORDER — IBUPROFEN 600 MG/1
600 TABLET, FILM COATED ORAL EVERY 6 HOURS
Status: DISCONTINUED | OUTPATIENT
Start: 2022-10-03 | End: 2022-10-06 | Stop reason: HOSPADM

## 2022-10-03 RX ORDER — LIDOCAINE 40 MG/G
CREAM TOPICAL
Status: DISCONTINUED | OUTPATIENT
Start: 2022-10-03 | End: 2022-10-03 | Stop reason: HOSPADM

## 2022-10-03 RX ORDER — ALBUTEROL SULFATE 90 UG/1
1-2 AEROSOL, METERED RESPIRATORY (INHALATION) EVERY 6 HOURS PRN
Status: DISCONTINUED | OUTPATIENT
Start: 2022-10-03 | End: 2022-10-06 | Stop reason: HOSPADM

## 2022-10-03 RX ORDER — BUPIVACAINE HYDROCHLORIDE 2.5 MG/ML
INJECTION, SOLUTION EPIDURAL; INFILTRATION; INTRACAUDAL
Status: COMPLETED | OUTPATIENT
Start: 2022-10-03 | End: 2022-10-03

## 2022-10-03 RX ORDER — DEXAMETHASONE SODIUM PHOSPHATE 4 MG/ML
INJECTION, SOLUTION INTRA-ARTICULAR; INTRALESIONAL; INTRAMUSCULAR; INTRAVENOUS; SOFT TISSUE PRN
Status: DISCONTINUED | OUTPATIENT
Start: 2022-10-03 | End: 2022-10-03

## 2022-10-03 RX ORDER — SODIUM CHLORIDE, SODIUM LACTATE, POTASSIUM CHLORIDE, CALCIUM CHLORIDE 600; 310; 30; 20 MG/100ML; MG/100ML; MG/100ML; MG/100ML
INJECTION, SOLUTION INTRAVENOUS CONTINUOUS
Status: DISCONTINUED | OUTPATIENT
Start: 2022-10-03 | End: 2022-10-04

## 2022-10-03 RX ORDER — ACETAMINOPHEN 325 MG/1
975 TABLET ORAL ONCE
Status: COMPLETED | OUTPATIENT
Start: 2022-10-03 | End: 2022-10-03

## 2022-10-03 RX ORDER — ONDANSETRON 2 MG/ML
INJECTION INTRAMUSCULAR; INTRAVENOUS PRN
Status: DISCONTINUED | OUTPATIENT
Start: 2022-10-03 | End: 2022-10-03

## 2022-10-03 RX ORDER — CEFAZOLIN SODIUM/WATER 2 G/20 ML
2 SYRINGE (ML) INTRAVENOUS SEE ADMIN INSTRUCTIONS
Status: DISCONTINUED | OUTPATIENT
Start: 2022-10-03 | End: 2022-10-03 | Stop reason: HOSPADM

## 2022-10-03 RX ORDER — FENTANYL CITRATE 50 UG/ML
25-50 INJECTION, SOLUTION INTRAMUSCULAR; INTRAVENOUS
Status: DISCONTINUED | OUTPATIENT
Start: 2022-10-03 | End: 2022-10-03 | Stop reason: HOSPADM

## 2022-10-03 RX ORDER — ACETAMINOPHEN 325 MG/1
650 TABLET ORAL EVERY 6 HOURS
Status: DISCONTINUED | OUTPATIENT
Start: 2022-10-03 | End: 2022-10-06 | Stop reason: HOSPADM

## 2022-10-03 RX ORDER — OXYCODONE HYDROCHLORIDE 5 MG/1
5 TABLET ORAL
Status: DISCONTINUED | OUTPATIENT
Start: 2022-10-03 | End: 2022-10-06 | Stop reason: HOSPADM

## 2022-10-03 RX ORDER — ONDANSETRON 2 MG/ML
4 INJECTION INTRAMUSCULAR; INTRAVENOUS EVERY 30 MIN PRN
Status: DISCONTINUED | OUTPATIENT
Start: 2022-10-03 | End: 2022-10-03 | Stop reason: HOSPADM

## 2022-10-03 RX ORDER — OXYCODONE HYDROCHLORIDE 5 MG/1
5 TABLET ORAL EVERY 4 HOURS PRN
Status: DISCONTINUED | OUTPATIENT
Start: 2022-10-03 | End: 2022-10-03

## 2022-10-03 RX ORDER — LIDOCAINE HYDROCHLORIDE 20 MG/ML
INJECTION, SOLUTION INFILTRATION; PERINEURAL PRN
Status: DISCONTINUED | OUTPATIENT
Start: 2022-10-03 | End: 2022-10-03

## 2022-10-03 RX ORDER — OXYCODONE HYDROCHLORIDE 5 MG/1
5 TABLET ORAL EVERY 4 HOURS PRN
Status: DISCONTINUED | OUTPATIENT
Start: 2022-10-03 | End: 2022-10-03 | Stop reason: HOSPADM

## 2022-10-03 RX ORDER — SODIUM CHLORIDE, SODIUM LACTATE, POTASSIUM CHLORIDE, CALCIUM CHLORIDE 600; 310; 30; 20 MG/100ML; MG/100ML; MG/100ML; MG/100ML
INJECTION, SOLUTION INTRAVENOUS CONTINUOUS PRN
Status: DISCONTINUED | OUTPATIENT
Start: 2022-10-03 | End: 2022-10-03

## 2022-10-03 RX ORDER — HYDRALAZINE HYDROCHLORIDE 20 MG/ML
10 INJECTION INTRAMUSCULAR; INTRAVENOUS EVERY 4 HOURS PRN
Status: DISCONTINUED | OUTPATIENT
Start: 2022-10-03 | End: 2022-10-06 | Stop reason: HOSPADM

## 2022-10-03 RX ORDER — DEXMEDETOMIDINE HYDROCHLORIDE 4 UG/ML
INJECTION, SOLUTION INTRAVENOUS PRN
Status: DISCONTINUED | OUTPATIENT
Start: 2022-10-03 | End: 2022-10-03

## 2022-10-03 RX ORDER — HYDROMORPHONE HYDROCHLORIDE 1 MG/ML
0.3 INJECTION, SOLUTION INTRAMUSCULAR; INTRAVENOUS; SUBCUTANEOUS ONCE
Status: COMPLETED | OUTPATIENT
Start: 2022-10-03 | End: 2022-10-03

## 2022-10-03 RX ORDER — NALOXONE HYDROCHLORIDE 0.4 MG/ML
0.2 INJECTION, SOLUTION INTRAMUSCULAR; INTRAVENOUS; SUBCUTANEOUS
Status: DISCONTINUED | OUTPATIENT
Start: 2022-10-03 | End: 2022-10-06 | Stop reason: HOSPADM

## 2022-10-03 RX ORDER — AMOXICILLIN 250 MG
2 CAPSULE ORAL 2 TIMES DAILY
Status: DISCONTINUED | OUTPATIENT
Start: 2022-10-03 | End: 2022-10-06 | Stop reason: HOSPADM

## 2022-10-03 RX ORDER — ONDANSETRON 4 MG/1
4 TABLET, ORALLY DISINTEGRATING ORAL EVERY 30 MIN PRN
Status: DISCONTINUED | OUTPATIENT
Start: 2022-10-03 | End: 2022-10-03 | Stop reason: HOSPADM

## 2022-10-03 RX ORDER — OXYCODONE HYDROCHLORIDE 10 MG/1
10 TABLET ORAL
Status: DISCONTINUED | OUTPATIENT
Start: 2022-10-03 | End: 2022-10-06 | Stop reason: HOSPADM

## 2022-10-03 RX ORDER — POLYETHYLENE GLYCOL 3350 17 G/17G
17 POWDER, FOR SOLUTION ORAL DAILY
Status: DISCONTINUED | OUTPATIENT
Start: 2022-10-03 | End: 2022-10-06 | Stop reason: HOSPADM

## 2022-10-03 RX ORDER — AMOXICILLIN 250 MG
1 CAPSULE ORAL 2 TIMES DAILY
Status: DISCONTINUED | OUTPATIENT
Start: 2022-10-03 | End: 2022-10-06 | Stop reason: HOSPADM

## 2022-10-03 RX ADMIN — PROPOFOL 160 MG: 10 INJECTION, EMULSION INTRAVENOUS at 07:49

## 2022-10-03 RX ADMIN — SODIUM CHLORIDE, POTASSIUM CHLORIDE, SODIUM LACTATE AND CALCIUM CHLORIDE: 600; 310; 30; 20 INJECTION, SOLUTION INTRAVENOUS at 10:00

## 2022-10-03 RX ADMIN — FENTANYL CITRATE 100 MCG: 50 INJECTION, SOLUTION INTRAMUSCULAR; INTRAVENOUS at 07:17

## 2022-10-03 RX ADMIN — Medication 10 MG: at 09:23

## 2022-10-03 RX ADMIN — OXYCODONE HYDROCHLORIDE 10 MG: 10 TABLET ORAL at 21:59

## 2022-10-03 RX ADMIN — Medication 20 MG: at 08:38

## 2022-10-03 RX ADMIN — HYDROMORPHONE HYDROCHLORIDE 0.2 MG: 1 INJECTION, SOLUTION INTRAMUSCULAR; INTRAVENOUS; SUBCUTANEOUS at 13:08

## 2022-10-03 RX ADMIN — LIDOCAINE PATCH 4% 1 PATCH: 40 PATCH TOPICAL at 21:59

## 2022-10-03 RX ADMIN — FENTANYL CITRATE 100 MCG: 50 INJECTION, SOLUTION INTRAMUSCULAR; INTRAVENOUS at 07:49

## 2022-10-03 RX ADMIN — OXYCODONE HYDROCHLORIDE 5 MG: 5 TABLET ORAL at 15:42

## 2022-10-03 RX ADMIN — Medication 8 MCG: at 10:31

## 2022-10-03 RX ADMIN — IBUPROFEN 600 MG: 600 TABLET ORAL at 19:13

## 2022-10-03 RX ADMIN — BUPIVACAINE 20 ML: 13.3 INJECTION, SUSPENSION, LIPOSOMAL INFILTRATION at 07:05

## 2022-10-03 RX ADMIN — HYDROMORPHONE HYDROCHLORIDE 0.5 MG: 1 INJECTION, SOLUTION INTRAMUSCULAR; INTRAVENOUS; SUBCUTANEOUS at 08:49

## 2022-10-03 RX ADMIN — SODIUM CHLORIDE, POTASSIUM CHLORIDE, SODIUM LACTATE AND CALCIUM CHLORIDE: 600; 310; 30; 20 INJECTION, SOLUTION INTRAVENOUS at 07:36

## 2022-10-03 RX ADMIN — BUPIVACAINE HYDROCHLORIDE 20 ML: 2.5 INJECTION, SOLUTION EPIDURAL; INFILTRATION; INTRACAUDAL; PERINEURAL at 07:05

## 2022-10-03 RX ADMIN — ONDANSETRON 4 MG: 2 INJECTION INTRAMUSCULAR; INTRAVENOUS at 12:33

## 2022-10-03 RX ADMIN — HYDROMORPHONE HYDROCHLORIDE 0.2 MG: 1 INJECTION, SOLUTION INTRAMUSCULAR; INTRAVENOUS; SUBCUTANEOUS at 12:20

## 2022-10-03 RX ADMIN — MIDAZOLAM 2 MG: 1 INJECTION INTRAMUSCULAR; INTRAVENOUS at 07:36

## 2022-10-03 RX ADMIN — Medication 2 G: at 07:58

## 2022-10-03 RX ADMIN — ONDANSETRON 4 MG: 2 INJECTION INTRAMUSCULAR; INTRAVENOUS at 10:36

## 2022-10-03 RX ADMIN — PROCHLORPERAZINE EDISYLATE 5 MG: 5 INJECTION INTRAMUSCULAR; INTRAVENOUS at 13:10

## 2022-10-03 RX ADMIN — LIDOCAINE HYDROCHLORIDE 80 MG: 20 INJECTION, SOLUTION INFILTRATION; PERINEURAL at 07:49

## 2022-10-03 RX ADMIN — HYDROMORPHONE HYDROCHLORIDE 0.2 MG: 1 INJECTION, SOLUTION INTRAMUSCULAR; INTRAVENOUS; SUBCUTANEOUS at 13:00

## 2022-10-03 RX ADMIN — PROCHLORPERAZINE EDISYLATE 5 MG: 5 INJECTION INTRAMUSCULAR; INTRAVENOUS at 14:40

## 2022-10-03 RX ADMIN — ACETAMINOPHEN 650 MG: 325 TABLET, FILM COATED ORAL at 21:59

## 2022-10-03 RX ADMIN — ACETAMINOPHEN 650 MG: 325 TABLET, FILM COATED ORAL at 15:42

## 2022-10-03 RX ADMIN — MIDAZOLAM 1 MG: 1 INJECTION INTRAMUSCULAR; INTRAVENOUS at 07:13

## 2022-10-03 RX ADMIN — HEPARIN SODIUM 5000 UNITS: 5000 INJECTION, SOLUTION INTRAVENOUS; SUBCUTANEOUS at 07:29

## 2022-10-03 RX ADMIN — Medication 20 MG: at 10:27

## 2022-10-03 RX ADMIN — FENTANYL CITRATE 50 MCG: 50 INJECTION, SOLUTION INTRAMUSCULAR; INTRAVENOUS at 08:38

## 2022-10-03 RX ADMIN — POLYETHYLENE GLYCOL 3350 17 G: 17 POWDER, FOR SOLUTION ORAL at 15:42

## 2022-10-03 RX ADMIN — FENTANYL CITRATE 50 MCG: 50 INJECTION, SOLUTION INTRAMUSCULAR; INTRAVENOUS at 10:22

## 2022-10-03 RX ADMIN — SODIUM CHLORIDE, POTASSIUM CHLORIDE, SODIUM LACTATE AND CALCIUM CHLORIDE: 600; 310; 30; 20 INJECTION, SOLUTION INTRAVENOUS at 14:40

## 2022-10-03 RX ADMIN — HYDROMORPHONE HYDROCHLORIDE 0.5 MG: 1 INJECTION, SOLUTION INTRAMUSCULAR; INTRAVENOUS; SUBCUTANEOUS at 10:41

## 2022-10-03 RX ADMIN — FENTANYL CITRATE 50 MCG: 50 INJECTION, SOLUTION INTRAMUSCULAR; INTRAVENOUS at 08:26

## 2022-10-03 RX ADMIN — HYDROMORPHONE HYDROCHLORIDE 0.3 MG: 1 INJECTION, SOLUTION INTRAMUSCULAR; INTRAVENOUS; SUBCUTANEOUS at 21:24

## 2022-10-03 RX ADMIN — FENTANYL CITRATE 25 MCG: 50 INJECTION, SOLUTION INTRAMUSCULAR; INTRAVENOUS at 11:48

## 2022-10-03 RX ADMIN — HYDROMORPHONE HYDROCHLORIDE 0.2 MG: 1 INJECTION, SOLUTION INTRAMUSCULAR; INTRAVENOUS; SUBCUTANEOUS at 12:10

## 2022-10-03 RX ADMIN — OXYCODONE HYDROCHLORIDE 5 MG: 5 TABLET ORAL at 18:02

## 2022-10-03 RX ADMIN — FENTANYL CITRATE 50 MCG: 50 INJECTION, SOLUTION INTRAMUSCULAR; INTRAVENOUS at 09:08

## 2022-10-03 RX ADMIN — FENTANYL CITRATE 25 MCG: 50 INJECTION, SOLUTION INTRAMUSCULAR; INTRAVENOUS at 11:56

## 2022-10-03 RX ADMIN — DEXAMETHASONE SODIUM PHOSPHATE 4 MG: 4 INJECTION, SOLUTION INTRA-ARTICULAR; INTRALESIONAL; INTRAMUSCULAR; INTRAVENOUS; SOFT TISSUE at 07:55

## 2022-10-03 RX ADMIN — ACETAMINOPHEN 975 MG: 325 TABLET, FILM COATED ORAL at 06:59

## 2022-10-03 RX ADMIN — SUGAMMADEX 200 MG: 100 INJECTION, SOLUTION INTRAVENOUS at 11:25

## 2022-10-03 RX ADMIN — Medication 50 MG: at 07:51

## 2022-10-03 RX ADMIN — HYDROMORPHONE HYDROCHLORIDE 0.2 MG: 1 INJECTION, SOLUTION INTRAMUSCULAR; INTRAVENOUS; SUBCUTANEOUS at 12:28

## 2022-10-03 RX ADMIN — Medication 8 MCG: at 11:08

## 2022-10-03 RX ADMIN — FENTANYL CITRATE 25 MCG: 50 INJECTION, SOLUTION INTRAMUSCULAR; INTRAVENOUS at 11:43

## 2022-10-03 RX ADMIN — METHOCARBAMOL 500 MG: 500 TABLET ORAL at 18:08

## 2022-10-03 RX ADMIN — FENTANYL CITRATE 25 MCG: 50 INJECTION, SOLUTION INTRAMUSCULAR; INTRAVENOUS at 12:02

## 2022-10-03 ASSESSMENT — ACTIVITIES OF DAILY LIVING (ADL)
ADLS_ACUITY_SCORE: 18
ADLS_ACUITY_SCORE: 18
ADLS_ACUITY_SCORE: 19
ADLS_ACUITY_SCORE: 18
ADLS_ACUITY_SCORE: 19
ADLS_ACUITY_SCORE: 18

## 2022-10-03 ASSESSMENT — LIFESTYLE VARIABLES: TOBACCO_USE: 1

## 2022-10-03 NOTE — ANESTHESIA POSTPROCEDURE EVALUATION
Patient: Karen Dumont    Procedure: Procedure(s):  TOTAL  ABDOMINAL HYSTERECTOMY, WITH BILATERAL SALPINGO-OOPHORECTOMY, PELVIC WASHINGS       Anesthesia Type:  Peripheral Nerve Block    Note:  Disposition: Inpatient   Postop Pain Control: Uneventful            Sign Out: Well controlled pain   PONV: No   Neuro/Psych: Uneventful            Sign Out: Acceptable/Baseline neuro status   Airway/Respiratory: Uneventful            Sign Out: Acceptable/Baseline resp. status   CV/Hemodynamics: Uneventful            Sign Out: Acceptable CV status; No obvious hypovolemia; No obvious fluid overload   Other NRE: NONE   DID A NON-ROUTINE EVENT OCCUR? No           Last vitals:  Vitals Value Taken Time   /76 10/03/22 1330   Temp 36.4  C (97.5  F) 10/03/22 1135   Pulse 82 10/03/22 1333   Resp 16 10/03/22 1230   SpO2 99 % 10/03/22 1333   Vitals shown include unvalidated device data.    Electronically Signed By: Andres Hurst MD  October 3, 2022  1:35 PM

## 2022-10-03 NOTE — PLAN OF CARE
Pt arrived to unit at 1516 from PACU. Pt transferred and VS obtained. Instructed pt how to use call light. Pt provided ice chips. Pharmacy in room upon pt arrival and went over home meds. Report given to onclex RN.

## 2022-10-03 NOTE — ANESTHESIA PROCEDURE NOTES
Airway       Patient location during procedure: OR       Procedure Start/Stop Times: 10/3/2022 7:55 AM  Staff -        CRNA: Marlena Cohen APRN CRNA       Performed By: CRNA  Consent for Airway        Urgency: elective  Indications and Patient Condition       Indications for airway management: brenda-procedural       Induction type:intravenous       Mask difficulty assessment: 1 - vent by mask    Final Airway Details       Final airway type: endotracheal airway       Successful airway: ETT - single and Oral  Endotracheal Airway Details        ETT size (mm): 6.5       Cuffed: yes       Successful intubation technique: direct laryngoscopy       DL Blade Type: Doyle 2       Grade View of Cords: 2       Adjucts: stylet       Position: Right       Measured from: gums/teeth       Secured at (cm): 19       Bite block used: None    Post intubation assessment        Placement verified by: capnometry, equal breath sounds and chest rise        Number of attempts at approach: 2       Secured with: pink tape       Ease of procedure: easy       Dentition: Unchanged and Intact    Medication(s) Administered   Medication Administration Time: 10/3/2022 7:55 AM

## 2022-10-03 NOTE — BRIEF OP NOTE
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]  Leiomyoma of uterus    Post-operative diagnosis Same as pre-operative diagnosis, benign on frozen pathology    Procedure: Procedure(s):  TOTAL  ABDOMINAL HYSTERECTOMY, WITH BILATERAL SALPINGO-OOPHORECTOMY, PELVIC WASHINGS  Surgeon: Surgeon(s) and Role:     * Ling Nicole MD - Primary     * Faby Solorzano MD - Fellow - Assisting     * Faby Magdaleno MD - Resident - Assisting     Anesthesia: General with Block   Estimated Blood Loss: 200 ml    Drains: None  Specimens:   ID Type Source Tests Collected by Time Destination   1 : pelvic washings Washings Pelvis NON-GYNECOLOGIC CYTOLOGY Ling Nicole MD 10/3/2022  8:33 AM    2 : Uterus, cervix, bilateral fallopian tubes and ovaries Tissue Uterus, Cervix, Bilateral Fallopian Tubes & Ovaries SURGICAL PATHOLOGY EXAM Ling Nicole MD 10/3/2022  9:44 AM      Findings:   EUA revealed small cyst palpable on anterior cervix, consistent with nabothian, reduncant anterior cervix. Large, mobile uterus approximately 15cm, with palpable fibroid on right lower uterus/parametrium, no palpable mass in cul de sac. No trauma on entry, small bowel run, without evidence of disease, normal omentum and appendix, smooth liver and diphragms. Normal appearing bilateral adnexal, uterus with multiple intramural fibroids - specificall on 3-4cm posterior and right near uterocervical junction. 1 cm subserosal fibroid on anterior lower uterine segment and large 4cm intramural fibroid at fundus. Ureters noted to vermiculate bilaterally before and at end of procedure. Surgical field hemostatic at end of case.    .  Complications: None.  Implants: * No implants in log *     Please see final Op Note for full details.    Faby Solorzano MD  Gynecology Oncology Fellow  October 3, 2022 , 11:23 AM

## 2022-10-03 NOTE — OP NOTE
[facility] Operative Note    Date of surgery: 10/3/2022    Preoperative diagnosis:  - EIN, with no evidence of malignancy on frozen section   - Leiomyomas   - Postmenopausal bleeding     Postoperative diagnosis:   - Same as above, s/p procedures below     Procedure:   - Total abdominal hysterectomy, bilateral salpingo-oophrectomy via vertical midline skin incision    Surgeon: Dr. Ling Nicole   Assistants:   - Faby Solorzano MD - Gyn Onc Fellow   - Faby Magdaleno MD PGY-4   - Juliana Pulliam MS4  Anesthesia: General endotracheal anesthesia  Complications: none apparent   EBL: 200mL  Fluids: 1500 mL LR  Urine output: 150mL clear urine at the end of the case  Pathology: Pelvic washings, uterus, cervix, left and right fallopian tubes and ovaries     Findings:  EUA revealed small cyst palpable on anterior cervix, consistent with nabothian, redundant anterior cervix. Large, mobile uterus approximately 15cm, with palpable fibroid on right lower uterus/parametrium, no palpable mass in cul de sac. No trauma on entry, small bowel run, without evidence of disease, normal omentum and appendix, smooth liver and diphragms. Normal appearing bilateral adnexal, uterus with multiple intramural fibroids - specificall on 3-4cm posterior and right near uterocervical junction. 1 cm subserosal fibroid on anterior lower uterine segment and large 4cm intramural fibroid at fundus. Ureters noted to vermiculate bilaterally before and at end of procedure. Surgical field hemostatic at end of case    Indications: Karen Dumont presented with postmenopausal bleeding and was found to have a large, myomatous uterus with large intramural fibroids measuring up to 5.5cm (7/3/2022). Endometrial biopsy returned fragments of endometrial type polyps featuring endometrial hyperplasia without atypia. She was recommended to undergo surgery but was initially unable to do so due to lack of insurance. Bleeding initially improved with  Megace, but increased significantly in early September and she desired to undergo surgical management. Abdominal hysterectomy was recommended. Surgical history notable for history of  section via vertical mid line skin incision. Her PMH is notable for cerebral palsy, anxiety, bipolar disorder, GERD and asthma. The risks, benefits and alternatives of surgical management were discussed in detail with the patient and she signed an informed consent on the morning of the procedure. She elected to proceed.     Procedure:  The patient was taken to the operating room where she underwent general endotrachael anesthesia without difficulty. She was placed in the dorsal lithotomy position and prepped & draped in the usual sterile fashion. A vertical midline skin incision through her prior incision was made approximately 2 cm above the symphysis pubis and extended with cautery to the rectus fascia. The fascia was then incised bilaterally with electrocautery and the muscles of the anterior abdominal wall were  in the midline by both blunt and electrocautery dissection.    The peritoneum was identified and entered sharply after ensuring no vital structures were present. The pelvis was examined with the above noted findings. Pelvic washings were collected.  A Bookwalter retractor was placed into the incision and the bowel packed away with moist laparotomy sponges.  The right round ligament was incised between the suture and the clip with cautery. The broad ligament was extended, and the posterior leaf cauterized toward the lateral pelvic sidewall parallel to the IP ligament. The tissue was bluntly dissected down, and the ureter was identified and noted to be out of the surgical field. A window was created in the mesosalpinx using cautery. The infundibulo-pelvic ligament was doubly clamped with curved parametrium clamp and tonsil clamp, then cut between the clamps. The proximal end was tied with two free ties of 2-0  vicryl, and the distal end with one free tie of 2-0 vicryl. The anterior leaf of the broad ligament was extended to the vesicouterine peritoneum across the superior cervix to create the bladder flap.  Attention was then turned to the left side which, in a similar fashion, had a suture placed through the round ligament, the broad ligament incised, tissue bluntly dissected, the ureter located, had a window created in the mesosalpinx, the IP ligament doubly clamped and cut, tied.  The uterine arteries were then skeletonized bilaterally and the vesicouterine peritoneum was further dissected off from the lower uterine segment. The bladder was dissected down and both uterine arteries were then clamped using curved parametrium clamp, transected and suture ligated using 2-0 Vicryl. The cardinal ligaments on both sides were then serially clamped using straight parametrium clamp, transected and suture ligated using 2-0 Vicryl to the level of the external cervical os. At this time, the vagina was then grasped from both sides using right angle parametrium clamp and the cervix was then amputated from the upper vagina using Valentina scissors. Vaginal cuff was closed using 2-0 vicryl with Yesi stitches at each apex and two figure-of-X stitches in the middle.The uterus, cervix, fallopian tubes and ovaries were handed off to pathology for frozen section. Pathology returned as benign. The bowel and omentum was serially examined with no abnormalities noted.     The pelvis was irrigated copiously with warmed normal saline. Hemostasis was assured. All laparotomy sponges and instruments were removed from the abdomen. The facia was closed with running looped 0-PDS and there was slow oozing from the left rectus muscle which was controlled with cautery and suture ligation with the fascial closure. Hemostasis was assured. The subcutaneous tissue was closed with 3-0 Vicryl for cosmesis and the skin was closed with 4-0 Monocryl. Sponge,  lap, instrument and needle counts were correct x 2.   The patient was taken to the PACU in stable condition. Dr. Nicole was present and scrubbed for the entire procedure.      Faby Magdaleno MD, [facility] [place]  Obstetrics and Gyncology, PGY-4  October 3, 2022 , 12:23 PM    Pager [phone]     As the primary surgeon, I was scrubbed and present for the full course of the procedure.    Ling Nicole M.D.

## 2022-10-03 NOTE — DISCHARGE SUMMARY
Gynecologic Oncology Discharge Summary    Karen Dumont  [id number]    Admit Date: 10/3/2022  Discharge Date: 10/5/2022  Admitting Provider: Ling Nicole MD  Discharge Provider: Padmini Rowe MD    Admission Dx:   - Endometrial Intraepithelial Neoplasia  - Abnormal Uterine Bleeding  - Fibroid Uterus  - Anxiety  - Asthma  - Bipolar affective disorder  - Hypertension  - GERD  - Migraine    Discharge Dx:  - Endometrial Intraepithelial Neoplasia  - Fibroid Uterus  - Anxiety  - Asthma  - Bipolar affective disorder  - Hypertension  - GERD  - Migraine    Patient Active Problem List   Diagnosis     Dysfunctional uterine bleeding     EIN (endometrial intraepithelial neoplasia)       Procedures: Exploratory Laparotomy, Total Abdominal Hysterectomy, Bilateral Salpingo-oophorectomy (10/3/2022)    Prior to Admission Medications:  Medications Prior to Admission   Medication Sig Dispense Refill Last Dose     albuterol (PROAIR HFA/PROVENTIL HFA/VENTOLIN HFA) 108 (90 Base) MCG/ACT inhaler Inhale 1-2 puffs into the lungs every 6 hours as needed   Past Week at Unknown time     lactobacillus rhamnosus (GG) (CULTURELL) capsule Take 1 capsule by mouth daily   Past Month at Unknown time     [DISCONTINUED] acetaminophen (TYLENOL) 325 MG tablet Take 325-650 mg by mouth every 6 hours as needed for mild pain   Past Week at Unknown time     [DISCONTINUED] ibuprofen (ADVIL/MOTRIN) 200 MG tablet Take 200 mg by mouth every 4 hours as needed for mild pain   10/2/2022 at Unknown time     [DISCONTINUED] megestrol (MEGACE) 40 MG tablet Take 2 tablets (80 mg) by mouth 2 times daily 84 tablet 0 10/3/2022 at 0400       Discharge Medications:     Review of your medicines      START taking      Dose / Directions   methocarbamol 500 MG tablet  Commonly known as: ROBAXIN  Used for: S/P hysterectomy      Dose: 500 mg  Take 1 tablet (500 mg) by mouth 4 times daily as needed for muscle spasms  Quantity: 10 tablet  Refills: 0     oxyCODONE 5 MG  tablet  Commonly known as: ROXICODONE  Used for: S/P hysterectomy      Dose: 5 mg  Take 1 tablet (5 mg) by mouth every 6 hours as needed for pain  Quantity: 12 tablet  Refills: 0     SENNA-docusate sodium 8.6-50 MG tablet  Commonly known as: SENNA S  Used for: S/P hysterectomy      Dose: 1 tablet  Take 1 tablet by mouth At Bedtime  Quantity: 30 tablet  Refills: 0        CONTINUE these medicines which may have CHANGED, or have new prescriptions. If we are uncertain of the size of tablets/capsules you have at home, strength may be listed as something that might have changed.      Dose / Directions   ibuprofen 600 MG tablet  Commonly known as: ADVIL/MOTRIN  This may have changed:     medication strength    how much to take    when to take this    reasons to take this  Used for: S/P hysterectomy      Dose: 600 mg  Take 1 tablet (600 mg) by mouth every 6 hours as needed for moderate pain  Quantity: 12 tablet  Refills: 0        CONTINUE these medicines which have NOT CHANGED      Dose / Directions   acetaminophen 325 MG tablet  Commonly known as: TYLENOL  Used for: S/P hysterectomy      Dose: 325-650 mg  Take 1-2 tablets (325-650 mg) by mouth every 6 hours as needed for mild pain  Quantity: 25 tablet  Refills: 0     albuterol 108 (90 Base) MCG/ACT inhaler  Commonly known as: PROAIR HFA/PROVENTIL HFA/VENTOLIN HFA      Dose: 1-2 puff  Inhale 1-2 puffs into the lungs every 6 hours as needed  Refills: 0     lactobacillus rhamnosus (GG) capsule      Dose: 1 capsule  Take 1 capsule by mouth daily  Refills: 0        STOP taking    megestrol 40 MG tablet  Commonly known as: MEGACE              Where to get your medicines      These medications were sent to [facility] [place], [place] - [address]  [address] [address]    Phone: [phone]     acetaminophen 325 MG tablet    ibuprofen 600 MG tablet    methocarbamol 500 MG tablet    oxyCODONE 5 MG tablet    SENNA-docusate sodium  8.6-50 MG tablet           Consultations:   None    Brief History of Illness:  Karen Dumont is a 53 yo female with endometrial intraepithelial neoplasia, abnormal uterine bleeding, and fibroid uterus now s/p exploratory laparotomy, total abdominal hysterectomy, and bilateral salpingo-oophorectomy on 10/3/2022.    Preoperative Imaging:   - Anterior fundal intramural fibroid measuring 5.3 x 2.8 x 4.6 cm, Anterior mid uterine body intramural/subserosal fibroid measuring 3.5 x 2.5 x 3.1 cm, Right uterine body intramural fibroid measuring 5.0 x 3.8 x 4.9 cm  - Endometrial stripe measures 56 mm. Heterogeneous echogenic endometrial lesion measuring approximately 4.5 x 3.6 x 3.5 cm with apparentvascular stalk.     Hospital Course:  Dz:   - Preoperative diagnosis was Endometrial Intraepithelial Neoplasia.  Frozen section at the time of the surgery showed Benign endometrial polyp; with decidualization.  Final pathology is pending at the time of discharge.  She will follow-up postoperatively for a care plan.  FEN:   - She was maintained on IVF tolerating adequate PO.  By discharge, she was tolerating a regular diet without nausea and vomiting and able to maintain her hydration without IVF supplementation.  Pain:   - Her pain was initially controlled with IV pain medications. Once tolerating PO pain meds, she was transitioned to a PO pain regimen.  Her pain was well controlled on this and she was discharged home with these medications.  CV:   - Patient has hypertension and is not on current home medications. She had some elevated blood pressures during admission which were associated with pain.  Her vital signs were stable while in house and she had no acute CV issues. She was instructed to follow-up with her PCP for management of HTN.  PULM:   - Patient has asthma, and uses albuterol PRN.  She was initially given O2 supplementation in to maintain her O2 sats in the immediate postop period and was transitioned off of  this without difficulty.  By discharge, her O2 sats were greater than 94% on RA.  She was encouraged to use her bedside IS while in house.  She had no acute pulmonary issues while in house.  HEME:   - Her preoperative Hgb was 15.  Her hgb dropped to 11.8 postoperatively and was stable at the time of discharge.  She had no other acute heme issues while in house.  GI:   - Patient has a history of GERD, not on home medications.   - She was made NPO prior to the procedure.  On POD#0, her diet was advanced to clear liquids and then advanced slowly as tolerated.  At the time of discharge, she was tolerating a regular diet without nausea and vomiting.  She will be discharged with a bowel regimen to prevent constipation in the postoperative period.  She had no acute GI issues while in house.  :    - A moon catheter was placed at the time of the surgery.  Once ambulating unassisted, the moon catheter was removed.  Prior to discharge, the patient was voiding spontaneously without difficulty.  She had no acute  issues while in house.  ID:   - The patient was AF during her hospitalization.  She received standard preoperative antibiotics without incident.    ENDO:   - no issues  PSYCH/NEURO:   - Patient has diagnosis of anxiety, bipolar affective disorder, and migraines, not currently on any home medications.  PPX:    - She was given SCDs, IS, PPI and lovenox during her hospital course.  She tolerated these prophylactic interventions without incident.  They were discontinued at the time of her discharge.      Discharge Instructions and Follow up:  Ms. Karen Dumont was discharged from the hospital with follow up for postoperative visit.    Discharge Diet: Regular  Discharge Activity: Lifting restricted to 15 pounds  No driving or operating machinery while on narcotic analgesics  Pelvic rest: abstain from intercourse and do not use tampons for 6 week(s)  Discharge Follow up:   Appointment with Dr. Nicole on  10/18/2022 at 12:30pm    Discharge Disposition:  Discharged to home        Discharge Staff: MD Gladys Dowell MD  Gynecologic Oncology, PGY-3  10/05/2022 7:23 AM      I have reviewed and edited Dr. White's Discharge Summary above.  Agree with summary of hospital course.      Padmini Rowe MD, MS, FACOG, FACS  10/6/2022  9:59 AM

## 2022-10-03 NOTE — ANESTHESIA PREPROCEDURE EVALUATION
Anesthesia Pre-Procedure Evaluation    Patient: Karen Dumont   MRN: [id number] : 1970        Procedure : Procedure(s):  TOTAL  ABDOMINAL HYSTERECTOMY, WITH BILATERAL SALPINGO-OOPHORECTOMY, POSSIBLE LYMPH NODE DISSECTION          Past Medical History:   Diagnosis Date     Anxiety      Asthma      Bipolar affective disorder (H)      Cerebral palsy (H)      EIN (endometrial intraepithelial neoplasia)      Essential hypertension      Gastroesophageal reflux disease without esophagitis      Migraine       Past Surgical History:   Procedure Laterality Date      SECTION      Via vertical midline skin incision     hysteroscopy and dilation and Curettage, removal endocervical polyp   2022      No Known Allergies   Social History     Tobacco Use     Smoking status: Current Every Day Smoker     Types: Cigarettes     Smokeless tobacco: Never Used     Tobacco comment: 2 cigarettes per day   Substance Use Topics     Alcohol use: Not Currently      Wt Readings from Last 1 Encounters:   22 79.4 kg (175 lb)        Anesthesia Evaluation   Pt has had prior anesthetic. Type: MAC.    No history of anesthetic complications       ROS/MED HX  ENT/Pulmonary:     (+) tobacco use (No tobacco use in 48+ hours), Current use, Intermittent, asthma     Neurologic: Comment: The patient reports she has cerebral palsy on the right side which is very mild but causes right sided weakness and a limp as she is unable to move her toes and foot. She reports 65% function on the right side.       Cardiovascular:     (+) hypertension-----Previous cardiac testing   Echo: Date: Results:    Stress Test: Date: Results:    ECG Reviewed: Date: 22 Results:  SR  Cath: Date: Results:      METS/Exercise Tolerance: 4 - Raking leaves, gardening    Hematologic:  - neg hematologic  ROS     Musculoskeletal:  - neg musculoskeletal ROS     GI/Hepatic:     (+) GERD, Symptomatic,     Renal/Genitourinary: Comment: EIN/ dysfunction uterine  bleeding       Endo:     (+) Obesity,     Psychiatric/Substance Use:     (+) psychiatric history bipolar, anxiety and other (comment) (insomnia)     Infectious Disease:       Malignancy:       Other:  - neg other ROS          Physical Exam    Airway  airway exam normal      Mallampati: II   TM distance: > 3 FB   Neck ROM: full   Mouth opening: > 3 cm    Respiratory Devices and Support         Dental  no notable dental history         Cardiovascular   cardiovascular exam normal       Rhythm and rate: regular and normal     Pulmonary   pulmonary exam normal        breath sounds clear to auscultation           OUTSIDE LABS:  CBC:   Lab Results   Component Value Date    WBC 11.5 (H) 09/08/2022    WBC 12.8 (H) 08/04/2022    HGB 15.0 09/08/2022    HGB 14.5 08/04/2022    HCT 44.3 09/08/2022    HCT 42.2 08/04/2022     09/08/2022     08/04/2022     BMP:   Lab Results   Component Value Date     08/04/2022    POTASSIUM 3.6 08/04/2022    CHLORIDE 106 08/04/2022    CO2 21 (L) 08/04/2022    BUN 4.9 (L) 08/04/2022    CR 0.51 08/04/2022    GLC 95 10/03/2022    GLC 96 08/04/2022     COAGS:   Lab Results   Component Value Date    PTT 38 08/04/2022    INR 1.05 08/04/2022     POC: No results found for: BGM, HCG, HCGS  HEPATIC: No results found for: ALBUMIN, PROTTOTAL, ALT, AST, GGT, ALKPHOS, BILITOTAL, BILIDIRECT, ABDULLAHI  OTHER:   Lab Results   Component Value Date    LACT 0.7 08/04/2022    EDWIN 9.4 08/04/2022    CRP 67.90 (H) 08/04/2022       Anesthesia Plan    ASA Status:  3      Anesthesia Type: Peripheral Nerve Block.   Induction: Intravenous.   Maintenance: Balanced.        Consents    Anesthesia Plan(s) and associated risks, benefits, and realistic alternatives discussed. Questions answered and patient/representative(s) expressed understanding.     - Discussed: Risks, Benefits and Alternatives for BOTH SEDATION and the PROCEDURE were discussed     - Discussed with:  Patient      - Extended Intubation/Ventilatory  Support Discussed: Yes.      - Patient is DNR/DNI Status: No    Use of blood products discussed: No .     Postoperative Care    Pain management: IV analgesics, Oral pain medications.   PONV prophylaxis: Ondansetron (or other 5HT-3), Dexamethasone or Solumedrol     Comments:                Malachi Mane MD

## 2022-10-03 NOTE — PROGRESS NOTES
"Gynecology Oncology Post-Operative Note  October 3, 2022    Subjective: Patient experiencing post-operative pain, just received dose of acetaminophen. Sitting reclined in bed without dizziness. Tolerating ice chips with mild nausea. No vomiting. Has not yet passed flatus or had bowel movement. Voiding with moon in place. Denies fevers, chills, chest pain, SOB.  No other questions or concerns in this interval post-operative period.    Objective:   /79   Pulse 99   Temp 97.6  F (36.4  C) (Oral)   Resp 18   Ht 1.473 m (4' 10\")   Wt 79 kg (174 lb 2.6 oz)   SpO2 99%   BMI 36.40 kg/m      General: Mild distress from abdominal pain, otherwise well appearing   CV: RRR  Resp: CTAB, no increased work of breathing  Abdomen: soft, tenderness overlying abdominal incision, non-distended  Incision: c/d/i, island bandage overlying midline incision  Extremities: nontender, no peripheral edema, SCD on left leg, BP cuff on right leg  Lines/Drains: PIV x2, moon    I/Os  (Post-Op)  IV: 1950 mL   PO: 60 mL   Urine: 400 mL   EBL: 200 mL      Assessment: Karen Dumont is a 53 yo female POD#0 with endometrial intraepithelial neoplasia, abnormal uterine bleeding, and fibroid uterus now s/p exploratory laparotomy, total abdominal hysterectomy, and bilateral salpingo-oophorectomy on 10/3/2022.    Active Problem list:  - Endometrial Intraepithelial Neoplasia  - Abnormal Uterine Bleeding  - Fibroid Uterus  - Anxiety  - Asthma  - Bipolar affective disorder  - Hypertension  - GERD  - Migraine    Plan:      Dz: Endometrial Intraepithelial Neoplasia, Fibroid uterus; benign endometrial polyp with decidualization on frozen pathology.  FEN: Regular diet, advance as tolerated  Pain: tylenol, oxy PRN   Heme: Hgb 15> EBL 200ml>  CV: Hypertensive post-operatively likely 2/2 to post-operative pain; however, patient also has diagnosis of hypertension with no home medication regimen.  Pulm: Asthma, continue on home PTA albuterol; " TUD  GI: GERD, continue on home pepcid   :  Moon in place post-op, plan to remove POD#1.  ID: s/p preop abx, no signs of acute infectious process.  Endo: NI  Psych/Neuro/MSK: NICHOL, CP, Bipolar, Migraines, Patient is not on home medications for the above diagnoses.  PPX: SCDs, IS  Dispo: pending goals  Drains/Lines: PIV x2, moon       Pt. seen and discussed with Gynecologic Oncology Team, Dr. Solorzano.  HPI completed by Juliana Pulliam, MS4.    Juliana Pulliam  Gynecologic Oncology, Sub-internship Medical Student, MS4  10/03/22 3:37 PM    Resident Attestation   I, Gladys White, was present with the medical student who participated in the service and in the documentation of the note.  I have verified the history and personally performed the physical exam and medical decision making.  I agree with the assessment and plan of care as documented in the note.  I have reviewed the note and made changes as necessary.    Gladys White MD  Obstetrics & Gynecology, PGY-3  10/03/2022 6:23 PM

## 2022-10-03 NOTE — ANESTHESIA PROCEDURE NOTES
TAP Procedure Note    Pre-Procedure   Staff -        Anesthesiologist:  Jerome Lawson MD       Resident/Fellow: Andres Hurst MD       Performed By: resident       Location: pre-op       Pre-Anesthestic Checklist: patient identified, IV checked, site marked, risks and benefits discussed, informed consent, monitors and equipment checked, pre-op evaluation, at physician/surgeon's request and post-op pain management  Timeout:       Correct Patient: Yes        Correct Procedure: Yes        Correct Site: Yes        Correct Position: Yes        Correct Laterality: Yes        Site Marked: Yes  Procedure Documentation  Procedure: TAP       Diagnosis: POST OPERATIVE PAIN       Laterality: bilateral       Patient Position: supine       Patient Prep/Sterile Barriers: sterile gloves, mask       Skin prep: Chloraprep       Needle Type: short bevel       Needle Gauge: 21.        Needle Length (millimeters): 110        Ultrasound guided       1. Ultrasound was used to identify targeted nerve, plexus, vascular marker, or fascial plane and place a needle adjacent to it in real-time.       2. Ultrasound was used to visualize the spread of anesthetic in close proximity to the above referenced structure.       3. A permanent image is entered into the patient's record.    Assessment/Narrative         The placement was negative for: blood aspirated, painful injection and site bleeding       Paresthesias: No.       Bolus given via needle..        Secured via.        Insertion/Infusion Method: Single Shot       Complications: none       Injection made incrementally with aspirations every 5 mL.    Medication(s) Administered   Bupivacaine 0.25% PF (Infiltration) - Infiltration   20 mL - 10/3/2022 7:05:00 AM  Bupivacaine liposome (Exparel) 1.3% LA inj susp (Infiltration) - Infiltration   20 mL - 10/3/2022 7:05:00 AM

## 2022-10-03 NOTE — PROGRESS NOTES
"Gynecology Oncology Progress Note  October 4, 2022    24 hour events:   - Surgery  - Post-operative pain requiring IV dilaudid  - Hypertensive BP's post-operatively  - Intermittent desaturation of SpO2 overnight to 87%, placed on 1L NC.    Subjective: Patient experienced worsening pain overnight requiring dilaudid. This am, pain well controlled. Sitting on edge of bed without dizziness. Tolerating PO liquids without nausea or vomiting. Plans to try solids for breakfast. Has not passed flatus or had a bowel movement. Olsen in place. Denies fevers, chills, chest pain, SOB. Also notes she is very tired and did not sleep well last night, hardly at all. Would like to try some vistaril.     Endorses heart burn. Endorses anxiety regarding recovery process, reassured, and no other questions or concerns mentioned.    Objective:   /56 (BP Location: Right leg)   Pulse 72   Temp 97.8  F (36.6  C) (Temporal)   Resp 16   Ht 1.473 m (4' 10\")   Wt 79 kg (174 lb 2.6 oz)   SpO2 95%   BMI 36.40 kg/m      General: NAD, well appearing   CV: RRR  Resp: CTAB, no increased work of breathing  Abdomen: soft, mild tenderness, non-distended  Incision: Dried blood overlying steri strips, well approximated incision, island bandage removed over midline incision and 2x ABD pads placed over incision with abdominal binder overlying.  Extremities: nontender, no peripheral edema, SCDs in place  Lines/Drains: PIV, Olsen    I/Os  (Yesterday // Since Midnight)  IV: 1950 mL //   PO:60 mL //   Urine 550 mL // 750 mL    New labs/imaging-  - WBC 14.4, Hgb 11.8, Plt 222    Assessment: Karen Dumont is a 51 yo female POD#1 with endometrial intraepithelial neoplasia, abnormal uterine bleeding, and fibroid uterus now s/p exploratory laparotomy, total abdominal hysterectomy, and bilateral salpingo-oophorectomy. Benign on frozen.     Active Problem list:  - Endometrial Intraepithelial Neoplasia  - Abnormal Uterine Bleeding  - Fibroid Uterus  - " Anxiety  - Asthma  - Bipolar affective disorder  - Hypertension  - GERD  - Migraine     Plan:       Dz: Endometrial Intraepithelial Neoplasia, Fibroid uterus; benign endometrial polyp with decidualization on frozen pathology.    FEN: Regular diet, advance as tolerated  Pain: Post-operative pain, tolerating tylenol, oxy PRN; received one dose of IV dilaudid overnight.  Heme: Hgb 15> EBL 200ml> 11.8, no signs of acute blood loss anemia. Reassuring abdominal exam, vitals normal and Adequate UOP. Will start lovenox this morning.   CV: Hypertensive post-operatively likely 2/2 to post-operative pain; however, patient also has diagnosis of hypertension with no home medication regimen. BP's improved after receiving pain medication  Pulm: Asthma, continue on home PTA albuterol, patient had intermittent desaturation to 87% SpO2, was placed on 1LNC overnight. Supplemental O2 turned off this am; does have a history of tobacco use disorder (quit3 days ago). Does not need nicotine replacement at this time   GI: GERD. Will order home pepcid for her. Awaiting return of bowel function, encouraged ambulation   :  Moon in place post-op, plan to remove this morning   ID: s/p preop abx, no signs of acute infectious process.  Endo: NI  Psych/Neuro/MSK: NICHOL, CP, Bipolar, Migraines, Patient is not on home medications for the above diagnoses. Patient endorses increased anxiety this am regarding post-operative recovery, plan to give  vistaril.  PPX: SCDs, IS  Dispo: pending goals  Drains/Lines: PIV x2, moon      Pt. seen and discussed with Gynecologic Oncology Team, Dr. Solorzano and Dr. Magdaleno  HPI completed by Juliana Pulliam, MS4.    Juliana Pulliam  Gynecologic Oncology, Sub-internship Medical Student, MS4  10/04/22 7:32 AM      Resident/Fellow Attestation   I, Faby Magdaleno MD, was present with the medical student who participated in the service and in the documentation of the note.  I have verified the history and personally  performed the physical exam and medical decision making.  I agree with the assessment and have edited the note as needed.     Faby Magdaleno MD  PGY4  Date of Service (when I saw the patient): 10/04/22      I have seen and examined the patient.  I have reviewed and edited Dr. Magdaleno's note above.      Padmini Rowe MD, MS, FACOG, FACS  10/4/2022  10:18 PM

## 2022-10-03 NOTE — ANESTHESIA CARE TRANSFER NOTE
Patient: Karen Dumont    Procedure: Procedure(s):  TOTAL  ABDOMINAL HYSTERECTOMY, WITH BILATERAL SALPINGO-OOPHORECTOMY, PELVIC WASHINGS       Diagnosis: EIN (endometrial intraepithelial neoplasia) [N85.02]  Diagnosis Additional Information: No value filed.    Anesthesia Type:   Peripheral Nerve Block     Note:    Oropharynx: oropharynx clear of all foreign objects and spontaneously breathing  Level of Consciousness: awake  Oxygen Supplementation: nasal cannula  Level of Supplemental Oxygen (L/min / FiO2): 3  Independent Airway: airway patency satisfactory and stable  Dentition: dentition unchanged  Vital Signs Stable: post-procedure vital signs reviewed and stable  Report to RN Given: handoff report given  Patient transferred to: PACU    Handoff Report: Identifed the Patient, Identified the Reponsible Provider, Reviewed the pertinent medical history, Discussed the surgical course, Reviewed Intra-OP anesthesia mangement and issues during anesthesia, Set expectations for post-procedure period and Allowed opportunity for questions and acknowledgement of understanding      Vitals:  Vitals Value Taken Time   /92 (96)    Temp     Pulse 78    Resp     SpO2 100 % 10/03/22 1136   Vitals shown include unvalidated device data.    Electronically Signed By: CARMEN Hilliard CRNA  October 3, 2022  11:37 AM

## 2022-10-03 NOTE — PHARMACY-ADMISSION MEDICATION HISTORY
Admission Medication History Completed by Pharmacy    See [facility] Admission Navigator for allergy information, preferred outpatient pharmacy, prior to admission medications and immunization status.     Medication History Sources:     Patient interview    Changes made to PTA medication list (reason):    Added: none    Deleted: prn hydrocodone/APAP, MVI, turmeric    Changed: None    Additional Information:    None    Prior to Admission medications    Medication Sig Last Dose Taking? Auth Provider Long Term End Date   acetaminophen (TYLENOL) 325 MG tablet Take 325-650 mg by mouth every 6 hours as needed for mild pain Past Week at Unknown time Yes Reported, Patient     albuterol (PROAIR HFA/PROVENTIL HFA/VENTOLIN HFA) 108 (90 Base) MCG/ACT inhaler Inhale 1-2 puffs into the lungs every 6 hours as needed Past Week at Unknown time Yes Reported, Patient Yes    ibuprofen (ADVIL/MOTRIN) 200 MG tablet Take 200 mg by mouth every 4 hours as needed for mild pain 10/2/2022 at Unknown time Yes Reported, Patient     lactobacillus rhamnosus (GG) (CULTURELL) capsule Take 1 capsule by mouth daily Past Month at Unknown time Yes Reported, Patient     megestrol (MEGACE) 40 MG tablet Take 2 tablets (80 mg) by mouth 2 times daily 10/3/2022 at 0400 Yes Ling Nicole MD Yes        Date completed: 10/03/22    Medication history completed by: Citlalli Luna, GueroD, BCPS

## 2022-10-03 NOTE — PROVIDER NOTIFICATION
Page provider 6:19  FYI Pt's /82. Just given Oxycodone and Robaxin for pain.    Response at 6:23 PM  Will not treat until BP gets to 180

## 2022-10-04 LAB
ANION GAP SERPL CALCULATED.3IONS-SCNC: 9 MMOL/L (ref 7–15)
BUN SERPL-MCNC: 8.9 MG/DL (ref 6–20)
CALCIUM SERPL-MCNC: 8.7 MG/DL (ref 8.6–10)
CHLORIDE SERPL-SCNC: 102 MMOL/L (ref 98–107)
CREAT SERPL-MCNC: 0.59 MG/DL (ref 0.51–0.95)
DEPRECATED HCO3 PLAS-SCNC: 22 MMOL/L (ref 22–29)
ERYTHROCYTE [DISTWIDTH] IN BLOOD BY AUTOMATED COUNT: 13.5 % (ref 10–15)
GFR SERPL CREATININE-BSD FRML MDRD: >90 ML/MIN/1.73M2
GLUCOSE BLDC GLUCOMTR-MCNC: 105 MG/DL (ref 70–99)
GLUCOSE SERPL-MCNC: 112 MG/DL (ref 70–99)
HCT VFR BLD AUTO: 34.1 % (ref 35–47)
HGB BLD-MCNC: 11.8 G/DL (ref 11.7–15.7)
MAGNESIUM SERPL-MCNC: 1.7 MG/DL (ref 1.7–2.3)
MCH RBC QN AUTO: 29.7 PG (ref 26.5–33)
MCHC RBC AUTO-ENTMCNC: 34.6 G/DL (ref 31.5–36.5)
MCV RBC AUTO: 86 FL (ref 78–100)
PATH REPORT.COMMENTS IMP SPEC: NORMAL
PATH REPORT.FINAL DX SPEC: NORMAL
PATH REPORT.GROSS SPEC: NORMAL
PATH REPORT.RELEVANT HX SPEC: NORMAL
PLATELET # BLD AUTO: 222 10E3/UL (ref 150–450)
POTASSIUM SERPL-SCNC: 3.8 MMOL/L (ref 3.4–5.3)
RBC # BLD AUTO: 3.97 10E6/UL (ref 3.8–5.2)
SODIUM SERPL-SCNC: 133 MMOL/L (ref 136–145)
WBC # BLD AUTO: 14.4 10E3/UL (ref 4–11)

## 2022-10-04 PROCEDURE — 36415 COLL VENOUS BLD VENIPUNCTURE: CPT | Performed by: STUDENT IN AN ORGANIZED HEALTH CARE EDUCATION/TRAINING PROGRAM

## 2022-10-04 PROCEDURE — 88305 TISSUE EXAM BY PATHOLOGIST: CPT | Mod: 26 | Performed by: PATHOLOGY

## 2022-10-04 PROCEDURE — 258N000003 HC RX IP 258 OP 636: Performed by: STUDENT IN AN ORGANIZED HEALTH CARE EDUCATION/TRAINING PROGRAM

## 2022-10-04 PROCEDURE — 85027 COMPLETE CBC AUTOMATED: CPT | Performed by: STUDENT IN AN ORGANIZED HEALTH CARE EDUCATION/TRAINING PROGRAM

## 2022-10-04 PROCEDURE — 120N000002 HC R&B MED SURG/OB UMMC

## 2022-10-04 PROCEDURE — 88112 CYTOPATH CELL ENHANCE TECH: CPT | Mod: 26 | Performed by: PATHOLOGY

## 2022-10-04 PROCEDURE — 250N000013 HC RX MED GY IP 250 OP 250 PS 637: Performed by: STUDENT IN AN ORGANIZED HEALTH CARE EDUCATION/TRAINING PROGRAM

## 2022-10-04 PROCEDURE — 83735 ASSAY OF MAGNESIUM: CPT | Performed by: STUDENT IN AN ORGANIZED HEALTH CARE EDUCATION/TRAINING PROGRAM

## 2022-10-04 PROCEDURE — 999N000128 HC STATISTIC PERIPHERAL IV START W/O US GUIDANCE

## 2022-10-04 PROCEDURE — 80048 BASIC METABOLIC PNL TOTAL CA: CPT | Performed by: STUDENT IN AN ORGANIZED HEALTH CARE EDUCATION/TRAINING PROGRAM

## 2022-10-04 RX ORDER — LIDOCAINE 40 MG/G
CREAM TOPICAL
Status: DISCONTINUED | OUTPATIENT
Start: 2022-10-04 | End: 2022-10-04

## 2022-10-04 RX ADMIN — ACETAMINOPHEN 650 MG: 325 TABLET, FILM COATED ORAL at 16:41

## 2022-10-04 RX ADMIN — OXYCODONE HYDROCHLORIDE 5 MG: 5 TABLET ORAL at 11:24

## 2022-10-04 RX ADMIN — POLYETHYLENE GLYCOL 3350 17 G: 17 POWDER, FOR SOLUTION ORAL at 08:04

## 2022-10-04 RX ADMIN — SODIUM CHLORIDE, POTASSIUM CHLORIDE, SODIUM LACTATE AND CALCIUM CHLORIDE: 600; 310; 30; 20 INJECTION, SOLUTION INTRAVENOUS at 03:24

## 2022-10-04 RX ADMIN — METHOCARBAMOL 500 MG: 500 TABLET ORAL at 10:21

## 2022-10-04 RX ADMIN — IBUPROFEN 600 MG: 600 TABLET ORAL at 06:44

## 2022-10-04 RX ADMIN — OXYCODONE HYDROCHLORIDE 10 MG: 10 TABLET ORAL at 22:06

## 2022-10-04 RX ADMIN — METHOCARBAMOL 500 MG: 500 TABLET ORAL at 16:41

## 2022-10-04 RX ADMIN — ACETAMINOPHEN 650 MG: 325 TABLET, FILM COATED ORAL at 22:06

## 2022-10-04 RX ADMIN — FAMOTIDINE 20 MG: 20 TABLET ORAL at 19:06

## 2022-10-04 RX ADMIN — SIMETHICONE 80 MG: 80 TABLET, CHEWABLE ORAL at 19:06

## 2022-10-04 RX ADMIN — ACETAMINOPHEN 650 MG: 325 TABLET, FILM COATED ORAL at 04:31

## 2022-10-04 RX ADMIN — BISACODYL 10 MG: 10 SUPPOSITORY RECTAL at 08:05

## 2022-10-04 RX ADMIN — OXYCODONE HYDROCHLORIDE 10 MG: 10 TABLET ORAL at 17:56

## 2022-10-04 RX ADMIN — OXYCODONE HYDROCHLORIDE 10 MG: 10 TABLET ORAL at 01:30

## 2022-10-04 RX ADMIN — FAMOTIDINE 20 MG: 20 TABLET ORAL at 08:05

## 2022-10-04 RX ADMIN — OXYCODONE HYDROCHLORIDE 5 MG: 5 TABLET ORAL at 15:06

## 2022-10-04 RX ADMIN — SENNOSIDES AND DOCUSATE SODIUM 2 TABLET: 50; 8.6 TABLET ORAL at 08:04

## 2022-10-04 RX ADMIN — SENNOSIDES AND DOCUSATE SODIUM 2 TABLET: 50; 8.6 TABLET ORAL at 19:06

## 2022-10-04 RX ADMIN — IBUPROFEN 600 MG: 600 TABLET ORAL at 01:29

## 2022-10-04 RX ADMIN — ACETAMINOPHEN 650 MG: 325 TABLET, FILM COATED ORAL at 10:21

## 2022-10-04 RX ADMIN — ALBUTEROL SULFATE 2 PUFF: 90 AEROSOL, METERED RESPIRATORY (INHALATION) at 08:07

## 2022-10-04 RX ADMIN — IBUPROFEN 600 MG: 600 TABLET ORAL at 13:58

## 2022-10-04 RX ADMIN — OXYCODONE HYDROCHLORIDE 5 MG: 5 TABLET ORAL at 04:31

## 2022-10-04 RX ADMIN — IBUPROFEN 600 MG: 600 TABLET ORAL at 19:07

## 2022-10-04 RX ADMIN — OXYCODONE HYDROCHLORIDE 5 MG: 5 TABLET ORAL at 08:04

## 2022-10-04 ASSESSMENT — ACTIVITIES OF DAILY LIVING (ADL)
ADLS_ACUITY_SCORE: 19

## 2022-10-04 NOTE — PROGRESS NOTES
2 RN full   skin assessment completed by Kenneth Montana RN and Angeline LANZA  Skin assessment finding: issues found Midline incision covered with Primapore dressing    Interventions/actions: other abdominal binder in place and ice pack for pain.      Will continue to monitor.

## 2022-10-04 NOTE — PROGRESS NOTES
Patient alert and oriented x 4, 's - notified provider. POD#0. Patient waked from the stretcher to room's bed using IV pole and SBA. Reported abdominal pain 10/10 given Oxycodone, Tylenol, Ibuprofen, Robaxin and one time dose of Dilaudid - somewhat effective. Last Oxycodone given at 10 pm and Lidocaine patch in place RUQ. Abdominal incision covered with Primapore and abd binder in place.  PIV infusing LR at 75 ml.  Incentive spirometer used. Capno in place and pulse on continuous. Regular diet, drank smoothie brought by daughter and son this evening. Ordered T-pump for pain management. Will continue to monitor.

## 2022-10-04 NOTE — PROGRESS NOTES
To patient bedside to address questions. Discussed post-operative discharge goals including ambulating without dizziness, eating/drinking without nausea/vomiting, voiding spontaneously, bowel function (passing flatus, BM), and pain well controlled on PO medications. Patient reports that she still has minimal PO intake due to lack of appetite but is otherwise meeting other goals. Having gas pain but otherwise no complaints, daughter can  patient tomorrow after work so if patient continues to do well and meet goals can potentially discharge tomorrow evening.     Will order simethicone for gas pain.     Leticia Kruse MD  Ob/Gyn PGY-1  10/04/22 6:41 PM

## 2022-10-04 NOTE — PROGRESS NOTES
Notified by RN that patient having increased pain. On arrival, patient states pain has never been well-controlled since surgery. Feels she cannot move otherwise pain is worse. Denies nausea, chest pain, SOB.    On exam, abdomen is soft, appropriately tender. Incision is overall c/d/i with small areas of shadowing. Low concern for intraabdominal bleeding at this time. Will give patient one time dose of IV dilaudid and give 10 oxycodone when it is due at 2200. Hopefully this will improve her pain control overnight. Lidocaine patch ordered as well.    Gladys White MD  Gynecologic Oncology, PGY-3  10/03/2022 9:42 PM

## 2022-10-04 NOTE — PLAN OF CARE
Assumed care for pt [id number]  Pt AOx4, VSS. Pt desating overnight to the 80's put on 1L 02. Denies nausea, pain managed with scheduled tylenol, ibuprofen and prn oxycodone. Midline incision with primapore, with marked drainage. Abdominal binder in place. PIV IV infusing IVMF at 75ml/hr. Pt dangled at bedside, and matched in place. Olsen patent with adequate urine output. Pt sleeping between cares.

## 2022-10-04 NOTE — PLAN OF CARE
"[id number]  Vitals: BP (!) 163/78 (BP Location: Right arm, Patient Position: Left side)   Pulse 75   Temp 97.6  F (36.4  C) (Tympanic)   Resp 18   Ht 1.473 m (4' 10\")   Wt 79 kg (174 lb 2.6 oz)   SpO2 98%   BMI 36.40 kg/m      Neuros: A/O X4. Able to make needs known. Anxious and tearful at times.    IV: PIV left SL   Labs/Electrolytes: Reviewed   Resp/trach: Denies sob sats >95  Diet: reg  Good appetite   GI/ . Denies nausea  no bm. Passing flatus  moon removed. Pt voided adeqetly  Skin: abd incision clean and intact Primapore,  no drainage, abdominal binder in place.   Pain: managed with pr oxy, robaxin scheduled tylenol and ibuprofen,Lidocen  patch in place   Activity: SBA. Pt walked in the room 3x   Shift update: BP is 188/70, offered PRN hydralazine, she is refusing at this time. Current /78.      Goal Outcome Evaluation:    Plan of Care Reviewed With: patient     Overall Patient Progress: no change           "

## 2022-10-04 NOTE — PROGRESS NOTES
"Gynecology Oncology Progress Note  October 5, 2022    24 hour events:   - IVF discontinued  - Olsen out  - Hypertensive yesterday afternoon 188/70 --> 163/78 --> 148/66 likely 2/2 to post-operative pain and untreated hypertension.     Subjective: Pain well controlled. Ambulating without dizziness. Tolerating PO without nausea or vomiting. Passing flatus, without bowel movement. Voiding spontaneously. Denies fevers, chills, chest pain, SOB. No other questions or concerns.    Objective:   BP (!) 144/67 (BP Location: Right leg)   Pulse 81   Temp 97.7  F (36.5  C) (Temporal)   Resp 18   Ht 1.473 m (4' 10\")   Wt 79 kg (174 lb 2.6 oz)   SpO2 97%   BMI 36.40 kg/m      General: NAD, well appearing   CV: RRR  Resp: CTAB, no increased work of breathing, no wheeze, rales, rhonchi, good aeration throughout lung fields  Abdomen: soft, non-tender, non-distended  Incision: Dried blood overlying steri strips, well approximated incision, ABD pad in place under abdominal binder  Extremities: nontender, no peripheral edema, SCDs not currently in place while patient is resting  Lines/Drains: PIV    I/Os  (Yesterday // Since Midnight)  IV: 0 mL // 3 mL  PO: 120 mL // Subjectively tolerating PO, no volume charted overnight  Urine: 1550 mL // 400 mL    Assessment: Karen Dumont is a 51 yo female POD#2 with endometrial intraepithelial neoplasia, abnormal uterine bleeding, and fibroid uterus now s/p exploratory laparotomy, total abdominal hysterectomy, and bilateral salpingo-oophorectomy. Benign on frozen.      Active Problem list:  - Endometrial Intraepithelial Neoplasia  - Abnormal Uterine Bleeding  - Fibroid Uterus  - Anxiety  - Bipolar affective disorder  - Asthma  - Hypertension - untreated  - GERD  - Migraine     Plan:       Dz: Endometrial Intraepithelial Neoplasia, Fibroid uterus; benign endometrial polyp with decidualization on frozen pathology.     FEN: Regular diet  Pain: Post-operative pain, scheduled tylenol and " ibuprofen, robaxin PRN, oxy PRN  Heme: Hgb 15> EBL 200ml> 11.8, no signs of acute blood loss anemia. Reassuring abdominal exam, vitals normal and Adequate UOP  CV: Hypertensive persistently post-operatively likely 2/2 to post-operative pain and untreated underlying hypertension. Patient declined hydralazine. BP's intermittently improved after receiving pain medication; however, her BP baseline likely 140s/60-70s. Encouraged outpatient PCP visit for hypertensive management optimization.   Pulm: Asthma, continue on home PTA albuterol PRN, only received one dose post-operatively, Has a history of tobacco use disorder (quit3 days ago). Does not need nicotine replacement at this time   GI: GERD. On pepcid, antiemetics, and stool softeners. Passing flatus without bowel movement.  : Olsen removed, voiding spontaneously, no urinary concerns.    ID: s/p preop abx, no signs of acute infectious process.  Endo: NI  Psych/Neuro/MSK: NICHOL, CP, Bipolar, Migraines, Patient is not on home medications for the above diagnoses. Patient's mood and anxiety improved compared to yesterday, s/p one dose vistaril yesterday.   PPX: SCDs, IS  Dispo: plan to discharge today  Drains/Lines: PIV x1      Pt. seen and discussed with Gynecologic Oncology Team, Dr. Solorzano and Dr. Magdaleno.  HPI completed by Juliana Pulliam, MS4.      Juliana Pulliam   Gynecologic Oncology, Sub-internship Medical Student, MS4  10/05/22 6:28 AM    Resident Attestation   I, Gladys White, was present with the medical student who participated in the service and in the documentation of the note.  I have verified the history and personally performed the physical exam and medical decision making.  I agree with the assessment and plan of care as documented in the note.  I have reviewed the note and made changes as necessary.    Gladys White MD  Obstetrics & Gynecology, PGY-3  10/05/2022 7:20 AM     Reviewed note above. Plan for discharge today. I did not personally see the  patient.    Padmini Rowe MD, MS, FACOG, FACS  10/5/2022  6:40 PM

## 2022-10-05 VITALS
WEIGHT: 174.16 LBS | DIASTOLIC BLOOD PRESSURE: 80 MMHG | TEMPERATURE: 98.1 F | RESPIRATION RATE: 18 BRPM | HEART RATE: 86 BPM | HEIGHT: 58 IN | BODY MASS INDEX: 36.56 KG/M2 | OXYGEN SATURATION: 96 % | SYSTOLIC BLOOD PRESSURE: 155 MMHG

## 2022-10-05 LAB — GLUCOSE BLDC GLUCOMTR-MCNC: 105 MG/DL (ref 70–99)

## 2022-10-05 PROCEDURE — 250N000013 HC RX MED GY IP 250 OP 250 PS 637: Performed by: STUDENT IN AN ORGANIZED HEALTH CARE EDUCATION/TRAINING PROGRAM

## 2022-10-05 PROCEDURE — 250N000011 HC RX IP 250 OP 636: Performed by: STUDENT IN AN ORGANIZED HEALTH CARE EDUCATION/TRAINING PROGRAM

## 2022-10-05 RX ORDER — OXYCODONE HYDROCHLORIDE 5 MG/1
5 TABLET ORAL EVERY 6 HOURS PRN
Qty: 12 TABLET | Refills: 0 | Status: SHIPPED | OUTPATIENT
Start: 2022-10-05 | End: 2022-10-08

## 2022-10-05 RX ORDER — ACETAMINOPHEN 325 MG/1
325-650 TABLET ORAL EVERY 6 HOURS PRN
Qty: 25 TABLET | Refills: 0 | Status: SHIPPED | OUTPATIENT
Start: 2022-10-05

## 2022-10-05 RX ORDER — SENNA AND DOCUSATE SODIUM 50; 8.6 MG/1; MG/1
1 TABLET, FILM COATED ORAL AT BEDTIME
Qty: 30 TABLET | Refills: 0 | Status: SHIPPED | OUTPATIENT
Start: 2022-10-05

## 2022-10-05 RX ORDER — IBUPROFEN 600 MG/1
600 TABLET, FILM COATED ORAL EVERY 6 HOURS PRN
Qty: 12 TABLET | Refills: 0 | Status: SHIPPED | OUTPATIENT
Start: 2022-10-05

## 2022-10-05 RX ORDER — LIDOCAINE 4 G/G
1 PATCH TOPICAL EVERY 24 HOURS
Qty: 5 PATCH | Refills: 0 | Status: SHIPPED | OUTPATIENT
Start: 2022-10-05

## 2022-10-05 RX ORDER — METHOCARBAMOL 500 MG/1
500 TABLET, FILM COATED ORAL 4 TIMES DAILY PRN
Qty: 10 TABLET | Refills: 0 | Status: SHIPPED | OUTPATIENT
Start: 2022-10-05

## 2022-10-05 RX ADMIN — FAMOTIDINE 20 MG: 20 TABLET ORAL at 07:55

## 2022-10-05 RX ADMIN — SENNOSIDES AND DOCUSATE SODIUM 2 TABLET: 50; 8.6 TABLET ORAL at 20:25

## 2022-10-05 RX ADMIN — SIMETHICONE 80 MG: 80 TABLET, CHEWABLE ORAL at 02:18

## 2022-10-05 RX ADMIN — LIDOCAINE PATCH 4% 1 PATCH: 40 PATCH TOPICAL at 11:05

## 2022-10-05 RX ADMIN — FAMOTIDINE 20 MG: 20 TABLET ORAL at 20:25

## 2022-10-05 RX ADMIN — OXYCODONE HYDROCHLORIDE 5 MG: 5 TABLET ORAL at 11:05

## 2022-10-05 RX ADMIN — IBUPROFEN 600 MG: 600 TABLET ORAL at 14:15

## 2022-10-05 RX ADMIN — ONDANSETRON 4 MG: 4 TABLET, ORALLY DISINTEGRATING ORAL at 10:18

## 2022-10-05 RX ADMIN — ACETAMINOPHEN 650 MG: 325 TABLET, FILM COATED ORAL at 21:31

## 2022-10-05 RX ADMIN — BISACODYL 10 MG: 10 SUPPOSITORY RECTAL at 12:18

## 2022-10-05 RX ADMIN — OXYCODONE HYDROCHLORIDE 5 MG: 5 TABLET ORAL at 14:15

## 2022-10-05 RX ADMIN — ACETAMINOPHEN 650 MG: 325 TABLET, FILM COATED ORAL at 04:32

## 2022-10-05 RX ADMIN — METHOCARBAMOL 500 MG: 500 TABLET ORAL at 20:25

## 2022-10-05 RX ADMIN — OXYCODONE HYDROCHLORIDE 10 MG: 10 TABLET ORAL at 07:54

## 2022-10-05 RX ADMIN — OXYCODONE HYDROCHLORIDE 5 MG: 5 TABLET ORAL at 20:25

## 2022-10-05 RX ADMIN — IBUPROFEN 600 MG: 600 TABLET ORAL at 01:03

## 2022-10-05 RX ADMIN — OXYCODONE HYDROCHLORIDE 10 MG: 10 TABLET ORAL at 04:32

## 2022-10-05 RX ADMIN — METHOCARBAMOL 500 MG: 500 TABLET ORAL at 02:20

## 2022-10-05 RX ADMIN — OXYCODONE HYDROCHLORIDE 5 MG: 5 TABLET ORAL at 17:23

## 2022-10-05 RX ADMIN — METHOCARBAMOL 500 MG: 500 TABLET ORAL at 08:20

## 2022-10-05 RX ADMIN — ACETAMINOPHEN 650 MG: 325 TABLET, FILM COATED ORAL at 10:07

## 2022-10-05 RX ADMIN — ACETAMINOPHEN 650 MG: 325 TABLET, FILM COATED ORAL at 16:19

## 2022-10-05 RX ADMIN — IBUPROFEN 600 MG: 600 TABLET ORAL at 20:25

## 2022-10-05 RX ADMIN — METHOCARBAMOL 500 MG: 500 TABLET ORAL at 14:27

## 2022-10-05 RX ADMIN — OXYCODONE HYDROCHLORIDE 5 MG: 5 TABLET ORAL at 01:03

## 2022-10-05 RX ADMIN — IBUPROFEN 600 MG: 600 TABLET ORAL at 07:55

## 2022-10-05 ASSESSMENT — ACTIVITIES OF DAILY LIVING (ADL)
ADLS_ACUITY_SCORE: 21
ADLS_ACUITY_SCORE: 19
ADLS_ACUITY_SCORE: 21

## 2022-10-05 NOTE — PLAN OF CARE
Goal Outcome Evaluation:    POD# 1 s/p NICOLE, BSO. Pt reports good pain control with current regimen, denies nausea, tolerating regular diet. PIV in  place. PRN Hydralazine available for SBP > 170. Pt is voiding and ambulating without difficulty, took a long walk on the [place] this evening. She also took a shower. Abdominal binder on. Pt reports positive flatus, last BM was on 10/2. Plan is to discharge home tomorrow.

## 2022-10-05 NOTE — PLAN OF CARE
Goal Outcome Evaluation: Adequate for discharge.   NEURO: lert and oriented x4. Able to make needs known.   RESPIRATORY: On room air, denies shortness of breath.   CARDIAC: Hypertensive, but does not reach parameter for hydralazine.   GI/: Abdominal pain and discomfort. C/o constipation. Suppository given. Pt had a small BM.   DIET: Regular diet, tolerating well.   PAIN/NAUSEA: Abdominal pain 4-5/10. Oxycodone, robaxin, schedule tylenol, and ibuprofen given. Lidocaine patch on pt abdomen. Zofran given for nausea x1.   INCISION/DRAINS: abdominal midline incision, CDI.   IV ACCESS: PIV removed  ACTIVITY: SBA.   LAB:  Reviewed.   PLAN: Pt will be discharging home between 9-10 pm. Pt Want all pain medications to be given at 8pm  before discharge. Discharge AVS still need to be printed and reviewed with patient.

## 2022-10-05 NOTE — PLAN OF CARE
[id number] Midline abdominal incision C/D/I with steri-strips. +bowel sounds. Passing flatus, no BM. Simethicone given x1 for abdominal cramping and gas discomfort. Abdominal pain otherwise managed with Tylenol, ibuprofen, Robaxin, and oxycodone. PIV saline locked. Voiding spontaneously adequate amounts. On regular diet, denies nausea. Up with SBA, ambulated hallways x1 overnight. VSS on room air. Plans for possible discharge late this evening. Pt's daughter is not able to transport pt home until 2100.

## 2022-10-06 ENCOUNTER — PATIENT OUTREACH (OUTPATIENT)
Dept: CARE COORDINATION | Facility: CLINIC | Age: 52
End: 2022-10-06

## 2022-10-06 ENCOUNTER — PATIENT OUTREACH (OUTPATIENT)
Dept: ONCOLOGY | Facility: CLINIC | Age: 52
End: 2022-10-06

## 2022-10-06 NOTE — PROGRESS NOTES
RN reached out to patient for post surgical call.     Patient is not doing well as she has only slept 4 hours total since being home.     Patient stated at first this was due to pain but then clarified that it is from anxiety. She states she has lots to worry about. Patient very concerned that her blood pressure was high when leaving the hospital. She was worried about doing something wrong. Patient states she is anxious and stressed.     RN reviewed all patients concerns.     Once all concerns reviewed patient was feeling better.     Patient denies any signs and symptoms of concern     RN and patient reviewed what to expect with recovery.     RN and patient re-reviewed pain.     Patient taking Tylenol/Ibuprofen and Oxycodone on schedule. Patient feels this is keeping pain tolerable. Patient also using heat and ice to help    Patient has 8-9 tablets of oxycodone remaining.     Reviewed bedtime hints to help relax prior to bedtime.      Patient will touch back tomorrow if a refill of pain medication is needed.     Patient will reach out to PCP if anxiety is still keeping her from sleeping     Yamilet Perez RN

## 2022-10-06 NOTE — PLAN OF CARE
PT Aox4, VSS.   Pt cooperative but having consistent situational anxiety with discharge time and lining up a ride as well as pt worrying about getting an infection. RN reassured pt that everything will workout and we can accommodate to her discharge time. All discharge questions were answered and pt was able to teach back dressing change instruction and when to contact MD with concerns.    All education completed and discharge instructions sent with patient.  Daughter picked up pt in the [employer]. Transport assisted pt to [facility] by wheel chair. All personal belongings and pharmacy meds sent with patient along with some dressing change supplies. PIV removed.       Goal Outcome Evaluation:    Problem: Plan of Care - These are the overarching goals to be used throughout the patient stay.    Goal: Absence of Hospital-Acquired Illness or Injury  Outcome: Met  Intervention: Identify and Manage Fall Risk  Recent Flowsheet Documentation  Taken 10/5/2022 2025 by Lis West RN  Safety Promotion/Fall Prevention:   activity supervised   assistive device/personal items within reach   clutter free environment maintained   fall prevention program maintained   increase visualization of patient   lighting adjusted   nonskid shoes/slippers when out of bed   patient and family education   safety round/check completed  Intervention: Prevent Skin Injury  Recent Flowsheet Documentation  Taken 10/5/2022 2025 by Lis West RN  Body Position: position changed independently  Intervention: Prevent and Manage VTE (Venous Thromboembolism) Risk  Recent Flowsheet Documentation  Taken 10/5/2022 2025 by Lis West RN  VTE Prevention/Management: SCDs (sequential compression devices) on  Activity Management:   ambulated in room   activity adjusted per tolerance  Goal: Optimal Comfort and Wellbeing  Outcome: Met  Goal: Readiness for Transition of Care  Outcome: Met     Problem: Bleeding (Hysterectomy)  Goal: Absence of  Bleeding  Outcome: Met     Problem: Bowel Motility Impaired (Hysterectomy)  Goal: Effective Bowel Elimination  Outcome: Met     Problem: Fluid and Electrolyte Imbalance (Hysterectomy)  Goal: Fluid and Electrolyte Balance  Outcome: Met     Problem: Infection (Hysterectomy)  Goal: Absence of Infection Signs and Symptoms  Outcome: Met     Problem: Ongoing Anesthesia Effects (Hysterectomy)  Goal: Anesthesia/Sedation Recovery  Outcome: Met  Intervention: Optimize Anesthesia Recovery  Recent Flowsheet Documentation  Taken 10/5/2022 2025 by Lis West, RN  Safety Promotion/Fall Prevention:   activity supervised   assistive device/personal items within reach   clutter free environment maintained   fall prevention program maintained   increase visualization of patient   lighting adjusted   nonskid shoes/slippers when out of bed   patient and family education   safety round/check completed  Administration (IS): self-administered  Level Incentive Spirometer (mL): 1500  Incentive Spirometer Predicted Level (mL): 1200  Number of Repetitions (IS): 5     Problem: Pain (Hysterectomy)  Goal: Acceptable Pain Control  Outcome: Met     Problem: Postoperative Nausea and Vomiting (Hysterectomy)  Goal: Nausea and Vomiting Relief  Outcome: Met     Problem: Postoperative Urinary Retention (Hysterectomy)  Goal: Effective Urinary Elimination  Outcome: Met     Problem: Respiratory Compromise (Hysterectomy)  Goal: Effective Oxygenation and Ventilation  Outcome: Met  Intervention: Optimize Oxygenation and Ventilation  Recent Flowsheet Documentation  Taken 10/5/2022 2025 by Lis West, RN  Head of Bed (HOB) Positioning: HOB at 20-30 degrees

## 2022-10-06 NOTE — PROGRESS NOTES
"Clinic Care Coordination Contact  [facility]: Post-Discharge Note  SITUATION                                                      Admission:    Admission Date: 10/03/22   Reason for Admission: Endometrial Intraepithelial Neoplasia, Abnormal Uterine Bleeding, Fibroid Uterus, Anxiety, Asthma, Bipolar affective disorder, Hypertension, GERD, Migraine  Discharge:   Discharge Date: 10/05/22  Discharge Diagnosis: Endometrial Intraepithelial Neoplasia, Fibroid Uterus, Anxiety, Asthma, Bipolar affective disorder, Hypertension, GERD, Migraine    BACKGROUND                                                      Per hospital discharge summary and inpatient provider notes:    Karen Dumont is a 53 yo female with endometrial intraepithelial neoplasia, abnormal uterine bleeding, and fibroid uterus now s/p exploratory laparotomy, total abdominal hysterectomy, and bilateral salpingo-oophorectomy on 10/3/2022.    ASSESSMENT      Discharge Assessment  How are you doing now that you are home?: \"Adapting\"  How are your symptoms? (Red Flag symptoms escalate to triage hotline per guidelines): Improved  Do you feel your condition is stable enough to be safe at home until your provider visit?: Yes  Does the patient have their discharge instructions? : Yes  Does the patient have questions regarding their discharge instructions? : No  Were you started on any new medications or were there changes to any of your previous medications? : Yes  Does the patient have all of their medications?: Yes  Do you have questions regarding any of your medications? : Yes (see comment) (She has a question about her stool/bowel movements when using the medications)  Do you have all of your needed medical supplies or equipment (DME)?  (i.e. oxygen tank, CPAP, cane, etc.): Yes  Discharge follow-up appointment scheduled within 14 calendar days? : Yes  Discharge Follow Up Appointment Date: 10/18/22  Discharge Follow Up Appointment Scheduled with?: " Specialty Care Provider    Post-op ([facility]W CTA Only)  If the patient had a surgery or procedure, do they have any questions for a nurse?: Yes (see comment) (CHW attempted to connect patient with a nurse. CHW was connected to the nurse phone number for her surgeon and left a VM asking nurse to call patient back directly. Patient was in agreement.)    PLAN                                                      Outpatient Plan:      Follow-up appointment with Dr. Nicole on 10/18/2022 at 12:30pm.    Future Appointments   Date Time Provider Department Center   10/18/2022 12:30 PM Ling Nicole MD [facility]         For any urgent concerns, please contact our 24 hour nurse triage line: [phone] ([phone])       Angeline Romo  Community Health Worker  [facility]  Ph: [phone]

## 2022-10-07 ENCOUNTER — PATIENT OUTREACH (OUTPATIENT)
Dept: ONCOLOGY | Facility: CLINIC | Age: 52
End: 2022-10-07

## 2022-10-07 NOTE — PROGRESS NOTES
Patient left a voicemail requesting a call to discuss Ibuprofen, tylenol and stool softener.     RN reviewed all patients questions and concerns to the best of her ability.     Patient denied need for oxycodone refill at this time    On-call information over the weekend review    Yamilet Perez RN

## 2022-10-11 LAB
PATH REPORT.COMMENTS IMP SPEC: NORMAL
PATH REPORT.FINAL DX SPEC: NORMAL
PATH REPORT.GROSS SPEC: NORMAL
PATH REPORT.INTRAOP OBS SPEC DOC: NORMAL
PATH REPORT.MICROSCOPIC SPEC OTHER STN: NORMAL
PATH REPORT.MICROSCOPIC SPEC OTHER STN: NORMAL
PATH REPORT.RELEVANT HX SPEC: NORMAL
PHOTO IMAGE: NORMAL

## 2022-10-11 PROCEDURE — 88331 PATH CONSLTJ SURG 1 BLK 1SPC: CPT | Mod: 26 | Performed by: PATHOLOGY

## 2022-10-11 PROCEDURE — 88342 IMHCHEM/IMCYTCHM 1ST ANTB: CPT | Mod: 26 | Performed by: PATHOLOGY

## 2022-10-11 PROCEDURE — 88341 IMHCHEM/IMCYTCHM EA ADD ANTB: CPT | Mod: 26 | Performed by: PATHOLOGY

## 2022-10-11 PROCEDURE — 88360 TUMOR IMMUNOHISTOCHEM/MANUAL: CPT | Mod: 26 | Performed by: PATHOLOGY

## 2022-10-11 PROCEDURE — 88309 TISSUE EXAM BY PATHOLOGIST: CPT | Mod: 26 | Performed by: PATHOLOGY

## 2022-10-13 ENCOUNTER — VIRTUAL VISIT (OUTPATIENT)
Dept: ONCOLOGY | Facility: CLINIC | Age: 52
End: 2022-10-13
Attending: OBSTETRICS & GYNECOLOGY
Payer: MEDICAID

## 2022-10-13 DIAGNOSIS — N85.02 EIN (ENDOMETRIAL INTRAEPITHELIAL NEOPLASIA): ICD-10-CM

## 2022-10-13 DIAGNOSIS — Z71.6 ENCOUNTER FOR TOBACCO USE CESSATION COUNSELING: Primary | ICD-10-CM

## 2022-10-13 PROCEDURE — 99024 POSTOP FOLLOW-UP VISIT: CPT | Mod: 95 | Performed by: OBSTETRICS & GYNECOLOGY

## 2022-10-13 NOTE — LETTER
10/13/2022         RE: Karen Dumont  [address]  [place] [address]        Dear Colleague,    Thank you for referring your patient, Karen Dumont, to the [facility]. Please see a copy of my visit note below.    Karen is a 52 year old who is being evaluated via a billable video visit.      Patient stated she is in the state of [place] for the visit today.    How would you like to obtain your AVS? MyChart  If the video visit is dropped, the invitation should be resent by: Text to cell phone: [phone]  Will anyone else be joining your video visit? No        Video-Visit Details    Video Start Time: 8:03 AM    Type of service:  Video Visit    Video End Time:8:12 AM    Originating Location (pt. Location): Home    Distant Location (provider location):  [facility]     Platform used for Video Visit: Paula Reynolds, Virtual Visit Facilitator          GYNECOLOGIC  ONCOLOGY CLINIC    Referring provider:    Linda Carroll MD  [address]   [place] [address]   RE: Karen Dumont  : 1970  JOEL: 2022    CC: Complex endometrial hyperplasia and myomatous uterus    HPI: Ms Karen Dumont is a 52 year old female who presents post a recent surgery      She was discharged to home on post operative day # 2. Since surgery she reports hot flashes and difficulty sleeping. She also quit smoking two weeks ago. She wants to try not going on HRT for now. She had constipation for 1 day which resolved with use of Senna. No pain, no issues with incision.    Work up to date  7/3//22 Pelvic US  FINDINGS:   Uterus: Anteverted uterus measuring 14.6 x 8.1 x 4.9 cm. Anterior fundal intramural fibroid measuring 5.3 x 2.8 x 4.6 cm. Anterior mid uterine body intramural/subserosal fibroid measuring 3.5 x 2.5 x 3.1 cm. Right uterine body intramural fibroid measuring 5.0 x 3.8 x 4.9 cm.   Endometrium: Endometrial  stripe measures 56 mm. Heterogeneous echogenic endometrial lesion measuring approximately 4.5 x 3.6 x 3.5 cm with apparentvascular stalk.      D & C, reviewed at [facility] report 22  CASE FROM [facility], [place], [place] ([id number], OBTAINED 2022):  A.  Cervix, polypectomy:  -Cervical tissue with nabothian cyst     B.  Endometrium, curettage:  -Fragments of endometrial polyps featuring endometrial hyperplasia without atypia     22 Hg 14.5      10/3/22 Surgery: Total abdominal hysterectomy, bilateral salpingo-oophrectomy via vertical midline skin incision  Cytology Negative    Pathology  Final Diagnosis  Uterus, cervix, bilateral fallopian tubes and ovaries, total abdominal hysterectomy with bilateral salpingo-oophorectomy:  - Endometrial atypical hyperplasia with clear cell change in an endometrial polyp as well as non-polypous endometrium  - Endometrial stromal decidualization and inactive glands, consistent with exogenous progesterone effect  - Cervix with benign Nabothian cysts  - Myometrium with adenomyosis with tubal metaplasia and benign leiomyomas  - Right ovary and bilateral fallopian tubes with no significant histopathologic abnormality  - Left ovary with focal endometriosis      OBGYN history and Health Maintenance:    Last Pap Smear: needs to obtain  Last Mammogram: none  Last Colonoscopy: none    Review of Systems:  Systemic:No weight changes.    Skin : No skin changes or new lesions.   Eye : No changes in vision.   Pulmonary: No cough or SOB.   Cardiovascular: No CP or palpitations  Gastrointestinal : No diarrhea, constipation, positive for abdominal pain. Bowel habits normal.   Genitourinary: No dysuria, urgency, positive for vaginal bleeding  Psychiatric: Positive for anxiety  Hematologic : No palpable lymph nodes.   Endocrine : No hot flashes. No heat/cold intolerance.      Neurological: No headaches, no numbness.     Past Medical History:   Diagnosis Date     Anxiety       Asthma      Bipolar affective disorder (H)      Cerebral palsy (H)      EIN (endometrial intraepithelial neoplasia)      Essential hypertension      Gastroesophageal reflux disease without esophagitis      Migraine        Past Surgical History:   Procedure Laterality Date      SECTION      Via vertical midline skin incision     HYSTERECTOMY TOTAL ABDOMINAL, BILATERAL SALPINGO-OOPHORECTOMY, NODE DISSECTION, COMBINED Bilateral 10/3/2022    Procedure: TOTAL  ABDOMINAL HYSTERECTOMY, WITH BILATERAL SALPINGO-OOPHORECTOMY, PELVIC WASHINGS;  Surgeon: Ling Nicole MD;  Location: [facility] OR     hysteroscopy and dilation and Curettage, removal endocervical polyp   2022   - via vertical midline skin incision       Current Outpatient Medications   Medication Sig Dispense Refill     acetaminophen (TYLENOL) 325 MG tablet Take 1-2 tablets (325-650 mg) by mouth every 6 hours as needed for mild pain 25 tablet 0     albuterol (PROAIR HFA/PROVENTIL HFA/VENTOLIN HFA) 108 (90 Base) MCG/ACT inhaler Inhale 1-2 puffs into the lungs every 6 hours as needed       ibuprofen (ADVIL/MOTRIN) 600 MG tablet Take 1 tablet (600 mg) by mouth every 6 hours as needed for moderate pain 12 tablet 0     lactobacillus rhamnosus (GG) (CULTURELL) capsule Take 1 capsule by mouth daily       Lidocaine (LIDOCARE) 4 % Patch Place 1 patch onto the skin every 24 hours To prevent lidocaine toxicity, patient should be patch free for 12 hrs daily. 5 patch 0     methocarbamol (ROBAXIN) 500 MG tablet Take 1 tablet (500 mg) by mouth 4 times daily as needed for muscle spasms 10 tablet 0     SENNA-docusate sodium (SENNA S) 8.6-50 MG tablet Take 1 tablet by mouth At Bedtime 30 tablet 0         No Known Allergies    Social History:  Social History     Tobacco Use     Smoking status: Every Day     Types: Cigarettes     Smokeless tobacco: Never     Tobacco comments:     2 cigarettes per day   Substance Use Topics     Alcohol use: Not Currently       Family  History:   The patient's family history is notable for   Family History   Problem Relation Age of Onset     Anesthesia Reaction No family hx of      Deep Vein Thrombosis (DVT) No family hx of          Physical Exam:   Virtual Visti  General: Alert and oriented, no acute distress  Psych: Mood stable        Assessment/Plan: Karen Dumont is a 52 year old woman s/p a total abdominal hysterectomy and bilateral salpingo-oophorectomy for complex endometrial hyperplasia and myomatous uterus.    Pathology reviewed with patient showing no evidence of malignancy. No further treated needed    Post operative recovery with complications    Follow up with PCP for ongoing medical care    Quit tobacco recently needs ongoing support    Surgical menopause- symptomatic but wants to manage it without HRT        Ling Nicole M.D., MPH,  F.A.C.O.G.  Professor  Department of Ob/Gyn and Women's Health  Division of Gynecologic Oncology  [facility]  [phone]                     Again, thank you for allowing me to participate in the care of your patient.        Sincerely,        Ling Nicole MD

## 2022-10-13 NOTE — PATIENT INSTRUCTIONS
No further follow up needed in Gynecology Oncology clinic    Call with new concerns    Follow up to establish primary care- referral placed today      Ling Nicole MD

## 2022-10-13 NOTE — NURSING NOTE
Karen Dumont 52 year old female presents for post op follow up. S/p  TOTAL  ABDOMINAL HYSTERECTOMY, WITH BILATERAL SALPINGO-OOPHORECTOMY, PELVIC WASHINGS on 10/3/22 for EIN. Patient states doing well however, she has been experiencing increased insomnia since surgery.  Lis Reynolds, Virtual Facilitator

## 2022-10-13 NOTE — PROGRESS NOTES
Karen is a 52 year old who is being evaluated via a billable video visit.      Patient stated she is in the state of [place] for the visit today.    How would you like to obtain your AVS? MyChart  If the video visit is dropped, the invitation should be resent by: Text to cell phone: [phone]  Will anyone else be joining your video visit? No        Video-Visit Details    Video Start Time: 8:03 AM    Type of service:  Video Visit    Video End Time:8:12 AM    Originating Location (pt. Location): Home    Distant Location (provider location):  [facility]     Platform used for Video Visit: Paula Reynolds, Virtual Visit Facilitator          GYNECOLOGIC  ONCOLOGY CLINIC    Referring provider:    Linda Carroll MD  [address] [place] [address]   RE: Karen Dumont  : 1970  JOEL: 2022    CC: Complex endometrial hyperplasia and myomatous uterus    HPI: Ms Karen Dumont is a 52 year old female who presents post a recent surgery      She was discharged to home on post operative day # 2. Since surgery she reports hot flashes and difficulty sleeping. She also quit smoking two weeks ago. She wants to try not going on HRT for now. She had constipation for 1 day which resolved with use of Senna. No pain, no issues with incision.    Work up to date  7/3//22 Pelvic US  FINDINGS:   Uterus: Anteverted uterus measuring 14.6 x 8.1 x 4.9 cm. Anterior fundal intramural fibroid measuring 5.3 x 2.8 x 4.6 cm. Anterior mid uterine body intramural/subserosal fibroid measuring 3.5 x 2.5 x 3.1 cm. Right uterine body intramural fibroid measuring 5.0 x 3.8 x 4.9 cm.   Endometrium: Endometrial stripe measures 56 mm. Heterogeneous echogenic endometrial lesion measuring approximately 4.5 x 3.6 x 3.5 cm with apparentvascular stalk.      D & C, reviewed at [facility] report 22  CASE FROM [facility], [place], [place] ([id number], OBTAINED  2022):  A.  Cervix, polypectomy:  -Cervical tissue with nabothian cyst     B.  Endometrium, curettage:  -Fragments of endometrial polyps featuring endometrial hyperplasia without atypia     22 Hg 14.5      10/3/22 Surgery: Total abdominal hysterectomy, bilateral salpingo-oophrectomy via vertical midline skin incision  Cytology Negative    Pathology  Final Diagnosis  Uterus, cervix, bilateral fallopian tubes and ovaries, total abdominal hysterectomy with bilateral salpingo-oophorectomy:  - Endometrial atypical hyperplasia with clear cell change in an endometrial polyp as well as non-polypous endometrium  - Endometrial stromal decidualization and inactive glands, consistent with exogenous progesterone effect  - Cervix with benign Nabothian cysts  - Myometrium with adenomyosis with tubal metaplasia and benign leiomyomas  - Right ovary and bilateral fallopian tubes with no significant histopathologic abnormality  - Left ovary with focal endometriosis      OBGYN history and Health Maintenance:    Last Pap Smear: needs to obtain  Last Mammogram: none  Last Colonoscopy: none    Review of Systems:  Systemic:No weight changes.    Skin : No skin changes or new lesions.   Eye : No changes in vision.   Pulmonary: No cough or SOB.   Cardiovascular: No CP or palpitations  Gastrointestinal : No diarrhea, constipation, positive for abdominal pain. Bowel habits normal.   Genitourinary: No dysuria, urgency, positive for vaginal bleeding  Psychiatric: Positive for anxiety  Hematologic : No palpable lymph nodes.   Endocrine : No hot flashes. No heat/cold intolerance.      Neurological: No headaches, no numbness.     Past Medical History:   Diagnosis Date     Anxiety      Asthma      Bipolar affective disorder (H)      Cerebral palsy (H)      EIN (endometrial intraepithelial neoplasia)      Essential hypertension      Gastroesophageal reflux disease without esophagitis      Migraine        Past Surgical History:    Procedure Laterality Date      SECTION      Via vertical midline skin incision     HYSTERECTOMY TOTAL ABDOMINAL, BILATERAL SALPINGO-OOPHORECTOMY, NODE DISSECTION, COMBINED Bilateral 10/3/2022    Procedure: TOTAL  ABDOMINAL HYSTERECTOMY, WITH BILATERAL SALPINGO-OOPHORECTOMY, PELVIC WASHINGS;  Surgeon: Ling Nicole MD;  Location: [facility] OR     hysteroscopy and dilation and Curettage, removal endocervical polyp   2022   - via vertical midline skin incision       Current Outpatient Medications   Medication Sig Dispense Refill     acetaminophen (TYLENOL) 325 MG tablet Take 1-2 tablets (325-650 mg) by mouth every 6 hours as needed for mild pain 25 tablet 0     albuterol (PROAIR HFA/PROVENTIL HFA/VENTOLIN HFA) 108 (90 Base) MCG/ACT inhaler Inhale 1-2 puffs into the lungs every 6 hours as needed       ibuprofen (ADVIL/MOTRIN) 600 MG tablet Take 1 tablet (600 mg) by mouth every 6 hours as needed for moderate pain 12 tablet 0     lactobacillus rhamnosus (GG) (CULTURELL) capsule Take 1 capsule by mouth daily       Lidocaine (LIDOCARE) 4 % Patch Place 1 patch onto the skin every 24 hours To prevent lidocaine toxicity, patient should be patch free for 12 hrs daily. 5 patch 0     methocarbamol (ROBAXIN) 500 MG tablet Take 1 tablet (500 mg) by mouth 4 times daily as needed for muscle spasms 10 tablet 0     SENNA-docusate sodium (SENNA S) 8.6-50 MG tablet Take 1 tablet by mouth At Bedtime 30 tablet 0         No Known Allergies    Social History:  Social History     Tobacco Use     Smoking status: Every Day     Types: Cigarettes     Smokeless tobacco: Never     Tobacco comments:     2 cigarettes per day   Substance Use Topics     Alcohol use: Not Currently       Family History:   The patient's family history is notable for   Family History   Problem Relation Age of Onset     Anesthesia Reaction No family hx of      Deep Vein Thrombosis (DVT) No family hx of          Physical Exam:   Virtual Visti  General: Alert and  oriented, no acute distress  Psych: Mood stable        Assessment/Plan: Karen Dumont is a 52 year old woman s/p a total abdominal hysterectomy and bilateral salpingo-oophorectomy for complex endometrial hyperplasia and myomatous uterus.    Pathology reviewed with patient showing no evidence of malignancy. No further treated needed    Post operative recovery with complications    Follow up with PCP for ongoing medical care    Quit tobacco recently needs ongoing support    Surgical menopause- symptomatic but wants to manage it without HRT        Ling Nicole M.D., MPH,  F.A.C.O.G.  Professor  Department of Ob/Gyn and Women's Health  Division of Gynecologic Oncology  [facility]  [phone]

## (undated) DEVICE — PREP CHLORAPREP 26ML TINTED HI-LITE ORANGE 930815

## (undated) DEVICE — SU VICRYL 2-0 CT-2 27" J333H

## (undated) DEVICE — SOL NACL 0.9% IRRIG 1000ML BOTTLE 2F7124

## (undated) DEVICE — SU PDS II 0 TP-1 60" Z991G

## (undated) DEVICE — SUCTION MANIFOLD NEPTUNE 2 SYS 4 PORT 0702-020-000

## (undated) DEVICE — GLOVE PROTEXIS MICRO 6.0  2D73PM60

## (undated) DEVICE — SYR BULB IRRIG DOVER 60 ML LATEX FREE 67000

## (undated) DEVICE — ESU PENCIL SMOKE EVAC W/ROCKER SWITCH 0703-047-000

## (undated) DEVICE — PAD PERI INDIV WRAP 11" 2022A

## (undated) DEVICE — SU VICRYL 2-0 CT-1 27" UND J259H

## (undated) DEVICE — CATH TRAY FOLEY 16FR W/URINE METER STATLOCK 303316A

## (undated) DEVICE — PACK GOWN 3/PK DISP XL SBA32GPFCB

## (undated) DEVICE — SU MONOCRYL 4-0 PS-2 27" UND Y426H

## (undated) DEVICE — DRSG STERI STRIP 1/2X4" R1547

## (undated) DEVICE — PREP SCRUB SOL EXIDINE 4% CHG 4OZ 29002-404

## (undated) DEVICE — LINEN TOWEL PACK X6 WHITE 5487

## (undated) DEVICE — BNDG ABDOMINAL BINDER 9X45-62" 79-89071

## (undated) DEVICE — PACK AB HYST II

## (undated) DEVICE — DRAPE LEGGINGS CLEAR 8430

## (undated) DEVICE — SU VICRYL 2-0 SH 27" UND J417H

## (undated) DEVICE — SOL ADH LIQUID BENZOIN SWAB 0.6ML C1544

## (undated) DEVICE — SUCTION SLEEVE NEPTUNE 2 165MM 0703-005-165

## (undated) DEVICE — LINEN TOWEL PACK X30 5481

## (undated) DEVICE — WIPES FOLEY CARE SURESTEP PROVON DFC100

## (undated) DEVICE — PANTIES MESH LG/XLG 2PK 706M2

## (undated) DEVICE — SU VICRYL 2-0 TIE 54" J615H

## (undated) DEVICE — GLOVE PROTEXIS BLUE W/NEU-THERA 6.5  2D73EB65

## (undated) DEVICE — SOL WATER IRRIG 1000ML BOTTLE 2F7114

## (undated) RX ORDER — DEXAMETHASONE SODIUM PHOSPHATE 4 MG/ML
INJECTION, SOLUTION INTRA-ARTICULAR; INTRALESIONAL; INTRAMUSCULAR; INTRAVENOUS; SOFT TISSUE
Status: DISPENSED
Start: 2022-10-03

## (undated) RX ORDER — LIDOCAINE HYDROCHLORIDE 20 MG/ML
INJECTION, SOLUTION EPIDURAL; INFILTRATION; INTRACAUDAL; PERINEURAL
Status: DISPENSED
Start: 2022-10-03

## (undated) RX ORDER — GLYCOPYRROLATE 0.2 MG/ML
INJECTION, SOLUTION INTRAMUSCULAR; INTRAVENOUS
Status: DISPENSED
Start: 2022-10-03

## (undated) RX ORDER — ONDANSETRON 2 MG/ML
INJECTION INTRAMUSCULAR; INTRAVENOUS
Status: DISPENSED
Start: 2022-10-03

## (undated) RX ORDER — HYDROMORPHONE HYDROCHLORIDE 1 MG/ML
INJECTION, SOLUTION INTRAMUSCULAR; INTRAVENOUS; SUBCUTANEOUS
Status: DISPENSED
Start: 2022-10-03

## (undated) RX ORDER — FENTANYL CITRATE 50 UG/ML
INJECTION, SOLUTION INTRAMUSCULAR; INTRAVENOUS
Status: DISPENSED
Start: 2022-10-03

## (undated) RX ORDER — PROPOFOL 10 MG/ML
INJECTION, EMULSION INTRAVENOUS
Status: DISPENSED
Start: 2022-10-03

## (undated) RX ORDER — SODIUM CHLORIDE, SODIUM LACTATE, POTASSIUM CHLORIDE, CALCIUM CHLORIDE 600; 310; 30; 20 MG/100ML; MG/100ML; MG/100ML; MG/100ML
INJECTION, SOLUTION INTRAVENOUS
Status: DISPENSED
Start: 2022-10-03

## (undated) RX ORDER — ACETAMINOPHEN 325 MG/1
TABLET ORAL
Status: DISPENSED
Start: 2022-10-03

## (undated) RX ORDER — HEPARIN SODIUM 5000 [USP'U]/.5ML
INJECTION, SOLUTION INTRAVENOUS; SUBCUTANEOUS
Status: DISPENSED
Start: 2022-10-03

## (undated) RX ORDER — ALBUTEROL SULFATE 90 UG/1
AEROSOL, METERED RESPIRATORY (INHALATION)
Status: DISPENSED
Start: 2022-10-03